# Patient Record
Sex: FEMALE | Race: WHITE | NOT HISPANIC OR LATINO | Employment: OTHER | URBAN - NONMETROPOLITAN AREA
[De-identification: names, ages, dates, MRNs, and addresses within clinical notes are randomized per-mention and may not be internally consistent; named-entity substitution may affect disease eponyms.]

---

## 2017-08-25 ENCOUNTER — OFFICE VISIT (OUTPATIENT)
Dept: PULMONOLOGY | Facility: CLINIC | Age: 57
End: 2017-08-25

## 2017-08-25 VITALS
OXYGEN SATURATION: 96 % | SYSTOLIC BLOOD PRESSURE: 170 MMHG | BODY MASS INDEX: 22.43 KG/M2 | HEIGHT: 68 IN | HEART RATE: 93 BPM | WEIGHT: 148 LBS | TEMPERATURE: 98.6 F | DIASTOLIC BLOOD PRESSURE: 78 MMHG

## 2017-08-25 DIAGNOSIS — R91.1 LUNG NODULE: Primary | ICD-10-CM

## 2017-08-25 DIAGNOSIS — J47.1 BRONCHIECTASIS WITH ACUTE EXACERBATION (HCC): ICD-10-CM

## 2017-08-25 PROCEDURE — 99204 OFFICE O/P NEW MOD 45 MIN: CPT | Performed by: INTERNAL MEDICINE

## 2017-08-25 RX ORDER — LEVOTHYROXINE SODIUM 88 MCG
TABLET ORAL
COMMUNITY
Start: 2017-08-17

## 2017-08-25 RX ORDER — ESCITALOPRAM OXALATE 10 MG/1
TABLET ORAL
COMMUNITY
Start: 2017-07-07

## 2017-08-25 NOTE — PROGRESS NOTES
Rain Pandey is a 57 y.o. female who is being seen for Lung Nodule    History of Present Illness   This 57-year-old female has been referred to us by her primary care provider for evaluation for a lung nodule.  Patient tells me that her primary care provider ordered a screening CT scan of her chest because of her smoking history.  She did not have any symptoms of increased cough chest pain or change in appetite.  She had some weight loss but that was intentional since she was found to have reviewed her blood sugar and she was following her diet.  Patient is a current smoker, smokes roughly 1 pack per day for the past 30-40 years.  One of her sister  of lung cancer.  Past Medical History:   Diagnosis Date   • Thyroid disease      History reviewed. No pertinent surgical history.  Family History   Problem Relation Age of Onset   • Diabetes Mother    • Heart disease Sister    • Cancer Sister       reports that she has been smoking Cigarettes.  She has a 30.00 pack-year smoking history. She has never used smokeless tobacco. She reports that she does not drink alcohol or use illicit drugs.  No Known Allergies        The following portions of the patient's history were reviewed and updated as appropriate: allergies, current medications, past family history, past medical history, past social history, past surgical history and problem list.    Review of Systems   Constitutional: Negative for appetite change, chills, diaphoresis and unexpected weight change.   HENT: Negative for sore throat, trouble swallowing and voice change.    Eyes: Negative for visual disturbance.   Respiratory: Negative for apnea, cough, choking, shortness of breath and wheezing.    Cardiovascular: Negative for chest pain, palpitations and leg swelling.   Gastrointestinal: Negative for abdominal pain, constipation, diarrhea, nausea and vomiting.   Endocrine: Negative for cold intolerance, heat intolerance, polydipsia, polyphagia and  "polyuria.   Genitourinary: Negative for difficulty urinating and dysuria.   Musculoskeletal: Negative for gait problem.   Skin: Negative for rash and wound.   Neurological: Negative for syncope and light-headedness.   Hematological: Negative for adenopathy.   Psychiatric/Behavioral: Negative for agitation, behavioral problems and confusion.   All other systems reviewed and are negative.      Objective   /78 (BP Location: Left arm, Patient Position: Sitting, Cuff Size: Adult)  Pulse 93  Temp 98.6 °F (37 °C) (Oral)   Ht 68\" (172.7 cm)  Wt 148 lb (67.1 kg)  SpO2 96%  BMI 22.5 kg/m2  Physical Exam   Constitutional: She is oriented to person, place, and time.   HENT:   Head: Normocephalic and atraumatic.   Nose: Mucosal edema present.   Eyes: EOM are normal. Pupils are equal, round, and reactive to light.   Neck: Neck supple.   Cardiovascular: Normal rate, regular rhythm and normal heart sounds.    Pulmonary/Chest: She has rhonchi.   Vesicular breath sound bilaterally with prolonged expiratory phase   Abdominal: Soft. Bowel sounds are normal.   Musculoskeletal: Normal range of motion. She exhibits no deformity.   Neurological: She is alert and oriented to person, place, and time.   Skin: Skin is warm and dry.   Psychiatric: She has a normal mood and affect. Her behavior is normal.   Nursing note and vitals reviewed.        Radiology:  Low-dose CT chest done in [facility] on August 1, 2017    Lab Results:  No results found for any previous visit.    Assessment      ICD-10-CM ICD-9-CM   1. Lung nodule R91.1 793.11   2. Bronchiectasis with acute exacerbation J47.1 494.1                DISCUSSION:  This is a pretty asymptomatic patient with an incidental finding of lung nodule noted during a screening chest CT.  The nodule size is approximately 1.4 cm.  She also had a family history of lung cancer.  Neoplastic process definitely remains a worrisome differential diagnosis.  I had a long discussion with the " patient.  General recommendation is to wait 3 months and repeat her chest CT.  Patient asked me questions that if this is cancer then ordered happen if she waits for 3 months?  Obviously the onset is that we don't want to wait for 3 months if indeed this is lung cancer.  Gave her alternatives of follow-up chest CT in 3 months or proceeding with a bronchoscopy for endobronchial evaluation and trying to have tissue diagnosis followed by a PET scan if necessary.  She understood that and took the alar option of going for bronchoscopy.  I told her that this would be done by our partner Dr. Dupont in [facility] and I would see her again once the cytology/pathology report is available.  She requested me to go ahead and schedule the test but she would call us if she changes her mind.    Plan    Bronchoscopy scheduled for August 31, 2017 per Dr. Dupont  No orders of the defined types were placed in this encounter.    No orders of the defined types were placed in this encounter.                 Ben Rizvi MD, FCCP, FAASM  Pulmonary, Critical Care, and Sleep Medicine

## 2017-08-31 ENCOUNTER — ANESTHESIA (OUTPATIENT)
Dept: PERIOP | Facility: HOSPITAL | Age: 57
End: 2017-08-31

## 2017-08-31 ENCOUNTER — HOSPITAL ENCOUNTER (OUTPATIENT)
Facility: HOSPITAL | Age: 57
Setting detail: HOSPITAL OUTPATIENT SURGERY
Discharge: HOME | End: 2017-08-31
Attending: INTERNAL MEDICINE | Admitting: INTERNAL MEDICINE

## 2017-08-31 ENCOUNTER — ANESTHESIA EVENT (OUTPATIENT)
Dept: PERIOP | Facility: HOSPITAL | Age: 57
End: 2017-08-31

## 2017-08-31 VITALS
WEIGHT: 148 LBS | HEART RATE: 85 BPM | TEMPERATURE: 97.9 F | HEIGHT: 68 IN | SYSTOLIC BLOOD PRESSURE: 153 MMHG | BODY MASS INDEX: 22.43 KG/M2 | RESPIRATION RATE: 20 BRPM | DIASTOLIC BLOOD PRESSURE: 80 MMHG | OXYGEN SATURATION: 97 %

## 2017-08-31 DIAGNOSIS — R91.1 LUNG NODULE: ICD-10-CM

## 2017-08-31 DIAGNOSIS — J47.1 BRONCHIECTASIS WITH ACUTE EXACERBATION (HCC): ICD-10-CM

## 2017-08-31 LAB
BASOPHILS # BLD AUTO: 0.05 10*3/MM3 (ref 0–0.3)
BASOPHILS NFR BLD AUTO: 1 % (ref 0–2)
DEPRECATED RDW RBC AUTO: 45.9 FL (ref 37–54)
EOSINOPHIL # BLD AUTO: 0.14 10*3/MM3 (ref 0–0.7)
EOSINOPHIL NFR BLD AUTO: 2.9 % (ref 0–5)
ERYTHROCYTE [DISTWIDTH] IN BLOOD BY AUTOMATED COUNT: 13.4 % (ref 11.5–14.5)
HCT VFR BLD AUTO: 45.8 % (ref 37–47)
HGB BLD-MCNC: 14.9 G/DL (ref 12–16)
IMM GRANULOCYTES # BLD: 0 10*3/MM3 (ref 0–0.03)
IMM GRANULOCYTES NFR BLD: 0 % (ref 0–0.5)
INR PPP: 0.95 (ref 0.9–1.1)
LYMPHOCYTES # BLD AUTO: 1.76 10*3/MM3 (ref 1–3)
LYMPHOCYTES NFR BLD AUTO: 36.1 % (ref 21–51)
MCH RBC QN AUTO: 31.3 PG (ref 27–33)
MCHC RBC AUTO-ENTMCNC: 32.5 G/DL (ref 33–37)
MCV RBC AUTO: 96.2 FL (ref 80–94)
MONOCYTES # BLD AUTO: 0.46 10*3/MM3 (ref 0.1–0.9)
MONOCYTES NFR BLD AUTO: 9.4 % (ref 0–10)
NEUTROPHILS # BLD AUTO: 2.47 10*3/MM3 (ref 1.4–6.5)
NEUTROPHILS NFR BLD AUTO: 50.6 % (ref 30–70)
PLATELET # BLD AUTO: 181 10*3/MM3 (ref 130–400)
PMV BLD AUTO: 10.5 FL (ref 6–10)
PROTHROMBIN TIME: 12.8 SECONDS (ref 11–15.4)
RBC # BLD AUTO: 4.76 10*6/MM3 (ref 4.2–5.4)
WBC NRBC COR # BLD: 4.88 10*3/MM3 (ref 4.5–12.5)

## 2017-08-31 PROCEDURE — 87205 SMEAR GRAM STAIN: CPT | Performed by: INTERNAL MEDICINE

## 2017-08-31 PROCEDURE — 25010000002 FENTANYL CITRATE (PF) 100 MCG/2ML SOLUTION: Performed by: NURSE ANESTHETIST, CERTIFIED REGISTERED

## 2017-08-31 PROCEDURE — 85610 PROTHROMBIN TIME: CPT | Performed by: INTERNAL MEDICINE

## 2017-08-31 PROCEDURE — 87116 MYCOBACTERIA CULTURE: CPT | Performed by: INTERNAL MEDICINE

## 2017-08-31 PROCEDURE — 87102 FUNGUS ISOLATION CULTURE: CPT | Performed by: INTERNAL MEDICINE

## 2017-08-31 PROCEDURE — 25010000002 DEXAMETHASONE PER 1 MG: Performed by: NURSE ANESTHETIST, CERTIFIED REGISTERED

## 2017-08-31 PROCEDURE — 25010000002 PROPOFOL 10 MG/ML EMULSION: Performed by: NURSE ANESTHETIST, CERTIFIED REGISTERED

## 2017-08-31 PROCEDURE — S0260 H&P FOR SURGERY: HCPCS | Performed by: INTERNAL MEDICINE

## 2017-08-31 PROCEDURE — 85025 COMPLETE CBC W/AUTO DIFF WBC: CPT | Performed by: INTERNAL MEDICINE

## 2017-08-31 PROCEDURE — 87070 CULTURE OTHR SPECIMN AEROBIC: CPT | Performed by: INTERNAL MEDICINE

## 2017-08-31 PROCEDURE — 31624 DX BRONCHOSCOPE/LAVAGE: CPT | Performed by: INTERNAL MEDICINE

## 2017-08-31 PROCEDURE — 87206 SMEAR FLUORESCENT/ACID STAI: CPT | Performed by: INTERNAL MEDICINE

## 2017-08-31 PROCEDURE — 25010000002 ONDANSETRON PER 1 MG: Performed by: NURSE ANESTHETIST, CERTIFIED REGISTERED

## 2017-08-31 PROCEDURE — 25010000002 MIDAZOLAM PER 1 MG: Performed by: NURSE ANESTHETIST, CERTIFIED REGISTERED

## 2017-08-31 PROCEDURE — 31652 BRONCH EBUS SAMPLNG 1/2 NODE: CPT | Performed by: INTERNAL MEDICINE

## 2017-08-31 RX ORDER — FENTANYL CITRATE 50 UG/ML
INJECTION, SOLUTION INTRAMUSCULAR; INTRAVENOUS AS NEEDED
Status: DISCONTINUED | OUTPATIENT
Start: 2017-08-31 | End: 2017-08-31 | Stop reason: SURG

## 2017-08-31 RX ORDER — LIDOCAINE HYDROCHLORIDE 20 MG/ML
INJECTION, SOLUTION INFILTRATION; PERINEURAL AS NEEDED
Status: DISCONTINUED | OUTPATIENT
Start: 2017-08-31 | End: 2017-08-31 | Stop reason: SURG

## 2017-08-31 RX ORDER — PROPOFOL 10 MG/ML
VIAL (ML) INTRAVENOUS AS NEEDED
Status: DISCONTINUED | OUTPATIENT
Start: 2017-08-31 | End: 2017-08-31 | Stop reason: SURG

## 2017-08-31 RX ORDER — SODIUM CHLORIDE, SODIUM LACTATE, POTASSIUM CHLORIDE, CALCIUM CHLORIDE 600; 310; 30; 20 MG/100ML; MG/100ML; MG/100ML; MG/100ML
125 INJECTION, SOLUTION INTRAVENOUS CONTINUOUS
Status: DISCONTINUED | OUTPATIENT
Start: 2017-08-31 | End: 2017-08-31 | Stop reason: HOSPADM

## 2017-08-31 RX ORDER — ONDANSETRON 2 MG/ML
INJECTION INTRAMUSCULAR; INTRAVENOUS AS NEEDED
Status: DISCONTINUED | OUTPATIENT
Start: 2017-08-31 | End: 2017-08-31 | Stop reason: SURG

## 2017-08-31 RX ORDER — MIDAZOLAM HYDROCHLORIDE 1 MG/ML
INJECTION INTRAMUSCULAR; INTRAVENOUS AS NEEDED
Status: DISCONTINUED | OUTPATIENT
Start: 2017-08-31 | End: 2017-08-31 | Stop reason: SURG

## 2017-08-31 RX ORDER — ONDANSETRON 2 MG/ML
4 INJECTION INTRAMUSCULAR; INTRAVENOUS ONCE AS NEEDED
Status: DISCONTINUED | OUTPATIENT
Start: 2017-08-31 | End: 2017-08-31 | Stop reason: HOSPADM

## 2017-08-31 RX ORDER — IPRATROPIUM BROMIDE AND ALBUTEROL SULFATE 2.5; .5 MG/3ML; MG/3ML
3 SOLUTION RESPIRATORY (INHALATION) ONCE AS NEEDED
Status: DISCONTINUED | OUTPATIENT
Start: 2017-08-31 | End: 2017-08-31 | Stop reason: HOSPADM

## 2017-08-31 RX ORDER — FAMOTIDINE 10 MG/ML
INJECTION, SOLUTION INTRAVENOUS AS NEEDED
Status: DISCONTINUED | OUTPATIENT
Start: 2017-08-31 | End: 2017-08-31 | Stop reason: SURG

## 2017-08-31 RX ORDER — DEXAMETHASONE SODIUM PHOSPHATE 4 MG/ML
INJECTION, SOLUTION INTRA-ARTICULAR; INTRALESIONAL; INTRAMUSCULAR; INTRAVENOUS; SOFT TISSUE AS NEEDED
Status: DISCONTINUED | OUTPATIENT
Start: 2017-08-31 | End: 2017-08-31 | Stop reason: SURG

## 2017-08-31 RX ORDER — FENTANYL CITRATE 50 UG/ML
50 INJECTION, SOLUTION INTRAMUSCULAR; INTRAVENOUS
Status: DISCONTINUED | OUTPATIENT
Start: 2017-08-31 | End: 2017-08-31 | Stop reason: HOSPADM

## 2017-08-31 RX ORDER — SODIUM CHLORIDE 0.9 % (FLUSH) 0.9 %
1-10 SYRINGE (ML) INJECTION AS NEEDED
Status: DISCONTINUED | OUTPATIENT
Start: 2017-08-31 | End: 2017-08-31 | Stop reason: HOSPADM

## 2017-08-31 RX ADMIN — PROPOFOL 150 MG: 10 INJECTION, EMULSION INTRAVENOUS at 09:51

## 2017-08-31 RX ADMIN — FAMOTIDINE 20 MG: 10 INJECTION, SOLUTION INTRAVENOUS at 09:56

## 2017-08-31 RX ADMIN — ONDANSETRON 4 MG: 2 INJECTION, SOLUTION INTRAMUSCULAR; INTRAVENOUS at 09:56

## 2017-08-31 RX ADMIN — LIDOCAINE HYDROCHLORIDE 60 MG: 20 INJECTION, SOLUTION INFILTRATION; PERINEURAL at 09:51

## 2017-08-31 RX ADMIN — EPHEDRINE SULFATE 10 MG: 50 INJECTION INTRAMUSCULAR; INTRAVENOUS; SUBCUTANEOUS at 10:00

## 2017-08-31 RX ADMIN — MIDAZOLAM HYDROCHLORIDE 2 MG: 1 INJECTION, SOLUTION INTRAMUSCULAR; INTRAVENOUS at 09:44

## 2017-08-31 RX ADMIN — FENTANYL CITRATE 50 MCG: 50 INJECTION INTRAMUSCULAR; INTRAVENOUS at 10:02

## 2017-08-31 RX ADMIN — FENTANYL CITRATE 50 MCG: 50 INJECTION INTRAMUSCULAR; INTRAVENOUS at 09:51

## 2017-08-31 RX ADMIN — EPHEDRINE SULFATE 5 MG: 50 INJECTION INTRAMUSCULAR; INTRAVENOUS; SUBCUTANEOUS at 10:10

## 2017-08-31 RX ADMIN — SODIUM CHLORIDE, POTASSIUM CHLORIDE, SODIUM LACTATE AND CALCIUM CHLORIDE: 600; 310; 30; 20 INJECTION, SOLUTION INTRAVENOUS at 09:47

## 2017-08-31 RX ADMIN — DEXAMETHASONE SODIUM PHOSPHATE 4 MG: 4 INJECTION, SOLUTION INTRAMUSCULAR; INTRAVENOUS at 09:56

## 2017-09-02 LAB
BACTERIA SPEC RESP CULT: NORMAL
GRAM STN SPEC: NORMAL

## 2017-09-05 LAB
LAB AP CASE REPORT: NORMAL
LAB AP CASE REPORT: NORMAL
Lab: NORMAL
Lab: NORMAL

## 2017-09-14 ENCOUNTER — OFFICE VISIT (OUTPATIENT)
Dept: PULMONOLOGY | Facility: CLINIC | Age: 57
End: 2017-09-14

## 2017-09-14 VITALS
WEIGHT: 148 LBS | SYSTOLIC BLOOD PRESSURE: 160 MMHG | BODY MASS INDEX: 22.43 KG/M2 | DIASTOLIC BLOOD PRESSURE: 66 MMHG | HEIGHT: 68 IN | HEART RATE: 93 BPM | TEMPERATURE: 98.3 F | OXYGEN SATURATION: 97 %

## 2017-09-14 DIAGNOSIS — R91.1 LUNG NODULE: Primary | ICD-10-CM

## 2017-09-14 DIAGNOSIS — J47.9 BRONCHIECTASIS WITHOUT COMPLICATION (HCC): ICD-10-CM

## 2017-09-14 PROCEDURE — 99214 OFFICE O/P EST MOD 30 MIN: CPT | Performed by: INTERNAL MEDICINE

## 2017-09-14 NOTE — PROGRESS NOTES
Rain Pandey is a 57 y.o. female who is being seen for Lung Nodule    History of Present Illness   Patient returns after bronchoscopy.  This patient was referred to us with an abnormal CT scan of the chest which was done as a screening CT.  The scan showed presence of bronchiectasis in the right midlung zone and distal to a bronchiectatic area there was an approximately 1.4 cm size nodule or density in the right middle lobe.  We recommended bronchoscopy for further evaluation, that was done by our partner Dr. Dupont.  Patient is here to discuss about the result.  Symptomatically she has usual exertional dyspnea and slight cough, no change in her overall status.  Past Medical History:   Diagnosis Date   • Thyroid disease      Past Surgical History:   Procedure Laterality Date   • BRONCHOSCOPY N/A 8/31/2017    Procedure: BRONCHOSCOPY WITH ENDOBRONCHIAL ULTRASOUND;  Surgeon: Marcello Dupont MD;  Location: [facility];  Service:    • COLONOSCOPY  2011     Family History   Problem Relation Age of Onset   • Diabetes Mother    • Heart disease Sister    • Cancer Sister       reports that she has been smoking Cigarettes.  She has a 30.00 pack-year smoking history. She has never used smokeless tobacco. She reports that she does not drink alcohol or use illicit drugs.  No Known Allergies        The following portions of the patient's history were reviewed and updated as appropriate: allergies, current medications, past family history, past medical history, past social history, past surgical history and problem list.    Review of Systems   Constitutional: Negative for appetite change, chills, diaphoresis and unexpected weight change.   HENT: Negative for sore throat, trouble swallowing and voice change.    Eyes: Negative for visual disturbance.   Respiratory: Negative for apnea, cough, choking, shortness of breath and wheezing.    Cardiovascular: Negative for chest pain, palpitations and leg swelling.  "  Gastrointestinal: Negative for abdominal pain, constipation, diarrhea, nausea and vomiting.   Endocrine: Negative for cold intolerance, heat intolerance, polydipsia, polyphagia and polyuria.   Genitourinary: Negative for difficulty urinating and dysuria.   Musculoskeletal: Negative for gait problem.   Skin: Negative for rash and wound.   Neurological: Negative for syncope and light-headedness.   Hematological: Negative for adenopathy.   Psychiatric/Behavioral: Negative for agitation, behavioral problems and confusion.   All other systems reviewed and are negative.      Objective   /66  Pulse 93  Temp 98.3 °F (36.8 °C) (Oral)   Ht 68\" (172.7 cm)  Wt 148 lb (67.1 kg)  SpO2 97%  BMI 22.5 kg/m2  Physical Exam   Constitutional: She is oriented to person, place, and time.   HENT:   Head: Normocephalic and atraumatic.   Nose: Mucosal edema present.   Eyes: EOM are normal. Pupils are equal, round, and reactive to light.   Neck: Neck supple.   Cardiovascular: Normal rate, regular rhythm and normal heart sounds.    Pulmonary/Chest: She has rhonchi.   Vesicular breath sound bilaterally with prolonged expiratory phase   Abdominal: Soft. Bowel sounds are normal.   Musculoskeletal: Normal range of motion. She exhibits no deformity.   Neurological: She is alert and oriented to person, place, and time.   Skin: Skin is warm and dry.   Psychiatric: She has a normal mood and affect. Her behavior is normal.   Nursing note and vitals reviewed.        Radiology:  No Images in the past 120 days found..    Lab Results:  Admission on 08/31/2017, Discharged on 08/31/2017   Component Date Value Ref Range Status   • Protime 08/31/2017 12.8  11.0 - 15.4 Seconds Final   • INR 08/31/2017 0.95  0.90 - 1.10 Final   • WBC 08/31/2017 4.88  4.50 - 12.50 10*3/mm3 Final   • RBC 08/31/2017 4.76  4.20 - 5.40 10*6/mm3 Final   • Hemoglobin 08/31/2017 14.9  12.0 - 16.0 g/dL Final   • Hematocrit 08/31/2017 45.8  37.0 - 47.0 % Final   • MCV " 08/31/2017 96.2* 80.0 - 94.0 fL Final   • MCH 08/31/2017 31.3  27.0 - 33.0 pg Final   • MCHC 08/31/2017 32.5* 33.0 - 37.0 g/dL Final   • RDW 08/31/2017 13.4  11.5 - 14.5 % Final   • RDW-SD 08/31/2017 45.9  37.0 - 54.0 fl Final   • MPV 08/31/2017 10.5* 6.0 - 10.0 fL Final   • Platelets 08/31/2017 181  130 - 400 10*3/mm3 Final   • Neutrophil % 08/31/2017 50.6  30.0 - 70.0 % Final   • Lymphocyte % 08/31/2017 36.1  21.0 - 51.0 % Final   • Monocyte % 08/31/2017 9.4  0.0 - 10.0 % Final   • Eosinophil % 08/31/2017 2.9  0.0 - 5.0 % Final   • Basophil % 08/31/2017 1.0  0.0 - 2.0 % Final   • Immature Grans % 08/31/2017 0.0  0.0 - 0.5 % Final   • Neutrophils, Absolute 08/31/2017 2.47  1.40 - 6.50 10*3/mm3 Final   • Lymphocytes, Absolute 08/31/2017 1.76  1.00 - 3.00 10*3/mm3 Final   • Monocytes, Absolute 08/31/2017 0.46  0.10 - 0.90 10*3/mm3 Final   • Eosinophils, Absolute 08/31/2017 0.14  0.00 - 0.70 10*3/mm3 Final   • Basophils, Absolute 08/31/2017 0.05  0.00 - 0.30 10*3/mm3 Final   • Immature Grans, Absolute 08/31/2017 0.00  0.00 - 0.03 10*3/mm3 Final   • Case Report 08/31/2017    Final                    Value: COR Non-Gyn Cytology                           Case: [id number]                                  Authorizing Provider:  Marcello Dupont MD         Collected:           08/31/2017 09:51 AM          Ordering Location:     [facility]      Received:            08/31/2017 01:36 PM                                 OPERATING ROOM DEPARTMENT                                                    Pathologist:           Russell Arevalo MD                                                      Specimens:   1) - Lung, Right Middle Lobe, RML Brushing                                                          2) - Bronchus, Bronch Wash                                                                          3) - Lung, Right Upper Lobe, RUL BAL                                                                4) - Lung, Right  Lower Lobe, RLL BAL                                                                5) - Lung, Right Middle Lobe, RML BAL                                                     • Fungus Stain 08/31/2017 Final report   Preliminary   • KOH/Calcofluor preparation 08/31/2017 Comment   Preliminary   • AFB Specimen Processing 08/31/2017 Concentration   Preliminary   • Acid Fast Smear 08/31/2017 Negative   Preliminary   • Respiratory Culture 08/31/2017 Light growth (2+) Normal Respiratory Antonella   Final   • Gram Stain Result 08/31/2017 Rare (1+) WBCs seen   Final   • Gram Stain Result 08/31/2017 No organisms seen   Final   • Fungus Stain 08/31/2017 Final report   Preliminary   • KOH/Calcofluor preparation 08/31/2017 Comment   Preliminary   • AFB Specimen Processing 08/31/2017 Concentration   Preliminary   • Acid Fast Smear 08/31/2017 Negative   Preliminary   • Respiratory Culture 08/31/2017 Scant growth (1+) Normal Respiratory Antonella   Final   • Gram Stain Result 08/31/2017 Rare (1+) WBCs seen   Final   • Gram Stain Result 08/31/2017 No organisms seen   Final   • Case Report 08/31/2017    Final                    Value:BH COR Fine Needle Aspirate                       Case: [id number]                                 Authorizing Provider:  Marcello Dupont MD         Collected:           08/31/2017 10:09 AM          Ordering Location:     [facility]      Received:            08/31/2017 01:37 PM                                 OPERATING ROOM DEPARTMENT                                                    Pathologist:           Russell Arevalo MD                                                      Specimen:    Bronchus, Subcarinal Fine Needle BX                                                       • Fungus Stain 08/31/2017 Final report   Preliminary   • KOH/Calcofluor preparation 08/31/2017 Comment   Preliminary   • AFB Specimen Processing 08/31/2017 Concentration   Preliminary   • Acid Fast Smear 08/31/2017  Negative   Preliminary   • Respiratory Culture 08/31/2017 Light growth (2+) Normal Respiratory Antonella   Final   • Gram Stain Result 08/31/2017 Moderate (3+) WBCs seen   Final   • Gram Stain Result 08/31/2017 Rare (1+) Epithelial cells seen   Final   • Gram Stain Result 08/31/2017 Few (2+) Gram positive cocci in pairs   Final       Assessment      ICD-10-CM ICD-9-CM   1. Lung nodule R91.1 793.11   2. Bronchiectasis without complication J47.9 494.0                DISCUSSION:  I have reviewed the bronchoscopy report, no endobronchial lesion was noted.  Bronchial washing, brushing and bronchoalveolar lavage from right middle lobe as well as right lower lobe did not show any malignant cells.  A subcarinal lymph node was sampled using endobronchial ultrasonogram, this was also negative for malignant cells.    I have discussed the result with the patient.  Told her that this is a great news but patient still remains very much worried.  She had strong family history of lung cancer and is very nervous about that.  I would send her for a PET scan to see if the nodule is hypermetabolic on not.  I like to reevaluate her again after that and further recommendation will depend on the findings.    Plan    Orders Placed This Encounter   Procedures   • NM Pet Skull Base To Mid Thigh     No orders of the defined types were placed in this encounter.                 Ben Rizvi MD, FCCP, FAASM  Pulmonary, Critical Care, and Sleep Medicine

## 2017-09-22 ENCOUNTER — HOSPITAL ENCOUNTER (OUTPATIENT)
Dept: PET IMAGING | Facility: HOSPITAL | Age: 57
Discharge: HOME | End: 2017-09-22
Attending: INTERNAL MEDICINE | Admitting: INTERNAL MEDICINE

## 2017-09-22 DIAGNOSIS — R91.1 LUNG NODULE: ICD-10-CM

## 2017-09-22 DIAGNOSIS — J47.9 BRONCHIECTASIS WITHOUT COMPLICATION (HCC): ICD-10-CM

## 2017-09-22 PROCEDURE — 78815 PET IMAGE W/CT SKULL-THIGH: CPT | Performed by: RADIOLOGY

## 2017-09-22 PROCEDURE — A9552 F18 FDG: HCPCS | Performed by: INTERNAL MEDICINE

## 2017-09-22 PROCEDURE — 0 FLUDEOXYGLUCOSE F18 SOLUTION: Performed by: INTERNAL MEDICINE

## 2017-09-22 PROCEDURE — 78815 PET IMAGE W/CT SKULL-THIGH: CPT

## 2017-09-22 RX ADMIN — FLUDEOXYGLUCOSE F18 1 DOSE: 300 INJECTION INTRAVENOUS at 14:09

## 2017-09-29 LAB
BACTERIA SPEC AEROBE CULT: NORMAL
FUNGUS SPEC CULT: NORMAL
FUNGUS SPEC FUNGUS STN: NORMAL

## 2017-10-03 ENCOUNTER — OFFICE VISIT (OUTPATIENT)
Dept: PULMONOLOGY | Facility: CLINIC | Age: 57
End: 2017-10-03

## 2017-10-03 VITALS
OXYGEN SATURATION: 98 % | HEIGHT: 68 IN | DIASTOLIC BLOOD PRESSURE: 80 MMHG | BODY MASS INDEX: 22.58 KG/M2 | HEART RATE: 95 BPM | TEMPERATURE: 98.2 F | SYSTOLIC BLOOD PRESSURE: 150 MMHG | WEIGHT: 149 LBS

## 2017-10-03 DIAGNOSIS — J47.9 BRONCHIECTASIS WITHOUT COMPLICATION (HCC): ICD-10-CM

## 2017-10-03 DIAGNOSIS — R91.1 LUNG NODULE: Primary | ICD-10-CM

## 2017-10-03 PROCEDURE — 99214 OFFICE O/P EST MOD 30 MIN: CPT | Performed by: INTERNAL MEDICINE

## 2017-10-03 NOTE — PROGRESS NOTES
Rain Pandey is a 57 y.o. female who is being seen for Lung Nodule    History of Present Illness   Patient returns after PET scan.  This patient presented to us with a lung nodule, a bronchoscopy was done no endobronchial lesion was noted and bronchial washing status BAL as well as fine-needle aspiration was negative for malignant cells.  We then decided to send her for a PET scan, she had it done in today is here to discuss about the result.    Symptom wise she still has the same symptoms of exertional dyspnea but did not notice any change.  Past Medical History:   Diagnosis Date   • Thyroid disease      Past Surgical History:   Procedure Laterality Date   • BRONCHOSCOPY N/A 8/31/2017    Procedure: BRONCHOSCOPY WITH ENDOBRONCHIAL ULTRASOUND;  Surgeon: Marcello Dupont MD;  Location: [facility];  Service:    • COLONOSCOPY  2011     Family History   Problem Relation Age of Onset   • Diabetes Mother    • Heart disease Sister    • Cancer Sister       reports that she has been smoking Cigarettes.  She has a 30.00 pack-year smoking history. She has never used smokeless tobacco. She reports that she does not drink alcohol or use illicit drugs.  No Known Allergies        The following portions of the patient's history were reviewed and updated as appropriate: allergies, current medications, past family history, past medical history, past social history, past surgical history and problem list.    Review of Systems   Constitutional: Negative for appetite change, chills, diaphoresis and unexpected weight change.   HENT: Negative for sore throat, trouble swallowing and voice change.    Eyes: Negative for visual disturbance.   Respiratory: Negative for apnea, cough, choking, shortness of breath and wheezing.    Cardiovascular: Negative for chest pain, palpitations and leg swelling.   Gastrointestinal: Negative for abdominal pain, constipation, diarrhea, nausea and vomiting.   Endocrine: Negative for cold intolerance,  "heat intolerance, polydipsia, polyphagia and polyuria.   Genitourinary: Negative for difficulty urinating and dysuria.   Musculoskeletal: Negative for gait problem.   Skin: Negative for rash and wound.   Neurological: Negative for syncope and light-headedness.   Hematological: Negative for adenopathy.   Psychiatric/Behavioral: Negative for agitation, behavioral problems and confusion.   All other systems reviewed and are negative.      Objective   /80  Pulse 95  Temp 98.2 °F (36.8 °C) (Oral)   Ht 68\" (172.7 cm)  Wt 149 lb (67.6 kg)  SpO2 98%  BMI 22.66 kg/m2  Physical Exam   Constitutional: She is oriented to person, place, and time.   HENT:   Head: Normocephalic and atraumatic.   Nose: Mucosal edema present.   Eyes: EOM are normal. Pupils are equal, round, and reactive to light.   Neck: Neck supple.   Cardiovascular: Normal rate, regular rhythm and normal heart sounds.    Pulmonary/Chest: She has rhonchi.   Vesicular breath sound bilaterally with prolonged expiratory phase   Abdominal: Soft. Bowel sounds are normal.   Musculoskeletal: Normal range of motion. She exhibits no deformity.   Neurological: She is alert and oriented to person, place, and time.   Skin: Skin is warm and dry.   Psychiatric: She has a normal mood and affect. Her behavior is normal.   Nursing note and vitals reviewed.        Radiology:  Nm Pet Skull Base To Mid Thigh    Result Date: 9/25/2017  NM PET SKULL BASE TO MID THIGH-      REASON FOR EXAM:  R91.1-Solitary pulmonary nodule; J47.9-Bronchiectasis,  uncomplicated.      COMPARISON:  There are no prior studies for comparison.      TECHNIQUE: The patient's glucose level was 72. 15.6 mCi of  fluorodeoxyglucose was injected. After a 1-hour incubation period PET  fusion CT images were obtained from the skull base to the mid thighs.      PET/CT FINDINGS: The PET and CT images from the base of the skull to the  thoracic inlet were unremarkable. There were no enlarged or  hypermetabolic " lymph nodes in the mediastinum or hilar areas. In the  right lung base anteriorly in the costophrenic angle region there is a  soft tissue density that measures 13 mm in diameter. There is some vague  alveolar infiltrate around this area. The peak SUV activity in this area  was 2.4 which is barely above background. This would be most consistent  with possibly a minimal inflammatory process. No other pulmonary  parenchymal abnormalities were demonstrated. There were no pleural  effusions. Below the diaphragm the distribution of the  fluorodeoxyglucose was physiologic. There were no enlarged lymph nodes  or abdominal masses.      IMPRESSION:  Small 13 mm opacity in the right lung anteriorly at the  cardiophrenic angle shows only minimal glucose uptake with an SUV value  reading of 2.4. This is felt to most likely represent a benign process.  Recommend correlation with biopsy and follow-up scans as clinically  indicated to further evaluate this area.      This report was finalized on 9/25/2017 10:16 AM by Dr. Hua Carvajal II, MD.      Lab Results:  Admission on 08/31/2017, Discharged on 08/31/2017   Component Date Value Ref Range Status   • Protime 08/31/2017 12.8  11.0 - 15.4 Seconds Final   • INR 08/31/2017 0.95  0.90 - 1.10 Final   • WBC 08/31/2017 4.88  4.50 - 12.50 10*3/mm3 Final   • RBC 08/31/2017 4.76  4.20 - 5.40 10*6/mm3 Final   • Hemoglobin 08/31/2017 14.9  12.0 - 16.0 g/dL Final   • Hematocrit 08/31/2017 45.8  37.0 - 47.0 % Final   • MCV 08/31/2017 96.2* 80.0 - 94.0 fL Final   • MCH 08/31/2017 31.3  27.0 - 33.0 pg Final   • MCHC 08/31/2017 32.5* 33.0 - 37.0 g/dL Final   • RDW 08/31/2017 13.4  11.5 - 14.5 % Final   • RDW-SD 08/31/2017 45.9  37.0 - 54.0 fl Final   • MPV 08/31/2017 10.5* 6.0 - 10.0 fL Final   • Platelets 08/31/2017 181  130 - 400 10*3/mm3 Final   • Neutrophil % 08/31/2017 50.6  30.0 - 70.0 % Final   • Lymphocyte % 08/31/2017 36.1  21.0 - 51.0 % Final   • Monocyte % 08/31/2017 9.4  0.0 - 10.0  % Final   • Eosinophil % 08/31/2017 2.9  0.0 - 5.0 % Final   • Basophil % 08/31/2017 1.0  0.0 - 2.0 % Final   • Immature Grans % 08/31/2017 0.0  0.0 - 0.5 % Final   • Neutrophils, Absolute 08/31/2017 2.47  1.40 - 6.50 10*3/mm3 Final   • Lymphocytes, Absolute 08/31/2017 1.76  1.00 - 3.00 10*3/mm3 Final   • Monocytes, Absolute 08/31/2017 0.46  0.10 - 0.90 10*3/mm3 Final   • Eosinophils, Absolute 08/31/2017 0.14  0.00 - 0.70 10*3/mm3 Final   • Basophils, Absolute 08/31/2017 0.05  0.00 - 0.30 10*3/mm3 Final   • Immature Grans, Absolute 08/31/2017 0.00  0.00 - 0.03 10*3/mm3 Final   • Case Report 08/31/2017    Final                    Value:[facility] COR Non-Gyn Cytology                           Case: [id number]                                  Authorizing Provider:  Marcello Dupont MD         Collected:           08/31/2017 09:51 AM          Ordering Location:     [facility]      Received:            08/31/2017 01:36 PM                                 OPERATING ROOM DEPARTMENT                                                    Pathologist:           Russell Arevalo MD                                                      Specimens:   1) - Lung, Right Middle Lobe, RML Brushing                                                          2) - Bronchus, Bronch Wash                                                                          3) - Lung, Right Upper Lobe, RUL BAL                                                                4) - Lung, Right Lower Lobe, RLL BAL                                                                5) - Lung, Right Middle Lobe, RML BAL                                                     • Fungus Stain 08/31/2017 Final report   Final   • KOH/Calcofluor preparation 08/31/2017 Comment   Final   • Culture 08/31/2017 Final report   Final   • Fungus (Mycology) Result 1 08/31/2017 Comment   Final   • AFB Specimen Processing 08/31/2017 Concentration   Preliminary   • Acid Fast Smear  08/31/2017 Negative   Preliminary   • Respiratory Culture 08/31/2017 Light growth (2+) Normal Respiratory Antonella   Final   • Gram Stain Result 08/31/2017 Rare (1+) WBCs seen   Final   • Gram Stain Result 08/31/2017 No organisms seen   Final   • Fungus Stain 08/31/2017 Final report   Final   • KOH/Calcofluor preparation 08/31/2017 Comment   Final   • Culture 08/31/2017 Final report   Final   • Fungus (Mycology) Result 1 08/31/2017 Comment   Final   • AFB Specimen Processing 08/31/2017 Concentration   Preliminary   • Acid Fast Smear 08/31/2017 Negative   Preliminary   • Respiratory Culture 08/31/2017 Scant growth (1+) Normal Respiratory Antonella   Final   • Gram Stain Result 08/31/2017 Rare (1+) WBCs seen   Final   • Gram Stain Result 08/31/2017 No organisms seen   Final   • Case Report 08/31/2017    Final                    Value:[facility] COR Fine Needle Aspirate                       Case: [id number]                                 Authorizing Provider:  Marcello Dupont MD         Collected:           08/31/2017 10:09 AM          Ordering Location:     [facility]      Received:            08/31/2017 01:37 PM                                 OPERATING ROOM DEPARTMENT                                                    Pathologist:           Russell Arevalo MD                                                      Specimen:    Bronchus, Subcarinal Fine Needle BX                                                       • Fungus Stain 08/31/2017 Final report   Final   • KOH/Calcofluor preparation 08/31/2017 Comment   Final   • Culture 08/31/2017 Final report   Final   • Fungus (Mycology) Result 1 08/31/2017 Comment   Final   • AFB Specimen Processing 08/31/2017 Concentration   Preliminary   • Acid Fast Smear 08/31/2017 Negative   Preliminary   • Respiratory Culture 08/31/2017 Light growth (2+) Normal Respiratory Antonella   Final   • Gram Stain Result 08/31/2017 Moderate (3+) WBCs seen   Final   • Gram Stain Result  08/31/2017 Rare (1+) Epithelial cells seen   Final   • Gram Stain Result 08/31/2017 Few (2+) Gram positive cocci in pairs   Final       Assessment      ICD-10-CM ICD-9-CM   1. Lung nodule R91.1 793.11   2. Bronchiectasis without complication J47.9 494.0                DISCUSSION:  I have reviewed the PET scan.  1.3 cm size left lower lobe nodule has SUV range of 2.4, which on radiologist is commenting as close to normal.  Patient is pretty much asymptomatic at this point.  Our plan is to repeat her CT scan of the chest in February, which would be 6 months from her prior CT scan.  I have told her to call us if she develops any new symptoms including increased shortness of breath, cough, chest pain, hemoptysis or weight loss.  In that case we would see her earlier and would act accordingly.    Plan    No orders of the defined types were placed in this encounter.    No orders of the defined types were placed in this encounter.                 Ben Rizvi MD, FCCP, FAASM  Pulmonary, Critical Care, and Sleep Medicine

## 2017-10-13 LAB
ACID FAST STN SPEC: NEGATIVE
BACTERIA SPEC AEROBE CULT: NEGATIVE
SPECIMEN PREPARATION: NORMAL

## 2018-03-29 ENCOUNTER — OFFICE VISIT (OUTPATIENT)
Dept: PULMONOLOGY | Facility: CLINIC | Age: 58
End: 2018-03-29

## 2018-03-29 VITALS
TEMPERATURE: 98.1 F | WEIGHT: 164.4 LBS | SYSTOLIC BLOOD PRESSURE: 149 MMHG | HEIGHT: 68 IN | HEART RATE: 91 BPM | BODY MASS INDEX: 24.92 KG/M2 | OXYGEN SATURATION: 98 % | DIASTOLIC BLOOD PRESSURE: 79 MMHG

## 2018-03-29 DIAGNOSIS — R91.1 LUNG NODULE: Primary | ICD-10-CM

## 2018-03-29 DIAGNOSIS — J47.9 BRONCHIECTASIS WITHOUT COMPLICATION (HCC): ICD-10-CM

## 2018-03-29 PROCEDURE — 99214 OFFICE O/P EST MOD 30 MIN: CPT | Performed by: INTERNAL MEDICINE

## 2018-03-29 RX ORDER — LISINOPRIL 5 MG/1
TABLET ORAL
COMMUNITY
Start: 2018-02-13

## 2018-03-29 NOTE — PROGRESS NOTES
Rain Pandey is a 58 y.o. female who is being seen for Lung Nodule    History of Present Illness   Patient returns for follow up for lung nodule.  She initially presented to us in August 2017, had a CT scan done in [facility] showing an 1.4 cm size nodule.  We did a bronchoscopy on her which was negative and later on need a PET scan.  The PET scan showed some hypermetabolic activity in the nodule with SUV range of 2.4.  Because of this we decided to repeat her CT scan in few months, today she is here to discuss about it.  She does not have any new complaints.  Past Medical History:   Diagnosis Date   • Thyroid disease      Past Surgical History:   Procedure Laterality Date   • BRONCHOSCOPY N/A 8/31/2017    Procedure: BRONCHOSCOPY WITH ENDOBRONCHIAL ULTRASOUND;  Surgeon: Marcello Dupont MD;  Location: [facility];  Service:    • COLONOSCOPY  2011     Family History   Problem Relation Age of Onset   • Diabetes Mother    • Heart disease Sister    • Cancer Sister       reports that she has been smoking Cigarettes.  She has a 30.00 pack-year smoking history. She has never used smokeless tobacco. She reports that she does not drink alcohol or use drugs.  No Known Allergies        Patient has received flu shot and pneumonia vaccination.     The following portions of the patient's history were reviewed and updated as appropriate: allergies, current medications, past family history, past medical history, past social history, past surgical history and problem list.    Review of Systems   Constitutional: Negative for appetite change, chills, diaphoresis and unexpected weight change.   HENT: Negative for sore throat, trouble swallowing and voice change.    Eyes: Negative for visual disturbance.   Respiratory: Positive for shortness of breath. Negative for apnea, cough, choking and wheezing.    Cardiovascular: Negative for chest pain, palpitations and leg swelling.   Gastrointestinal: Negative for  "abdominal pain, constipation, diarrhea, nausea and vomiting.   Endocrine: Negative for cold intolerance, heat intolerance, polydipsia, polyphagia and polyuria.   Genitourinary: Negative for difficulty urinating and dysuria.   Musculoskeletal: Negative for gait problem.   Skin: Negative for rash and wound.   Neurological: Negative for syncope and light-headedness.   Hematological: Negative for adenopathy.   Psychiatric/Behavioral: Negative for agitation, behavioral problems and confusion.   All other systems reviewed and are negative.      Objective   /79   Pulse 91   Temp 98.1 °F (36.7 °C)   Ht 172.7 cm (67.99\")   Wt 74.6 kg (164 lb 6.4 oz)   SpO2 98%   BMI 25.00 kg/m²   Physical Exam   Constitutional: She is oriented to person, place, and time. She appears well-developed and well-nourished.   HENT:   Head: Normocephalic and atraumatic.   Nose: Nose normal.   Mouth/Throat: Oropharynx is clear and moist.   Eyes: EOM are normal. Pupils are equal, round, and reactive to light.   Neck: Normal range of motion. Neck supple.   Cardiovascular: Normal rate, regular rhythm and normal heart sounds.    Pulmonary/Chest: Breath sounds normal. She has no wheezes. She has no rales.   Abdominal: Soft. Bowel sounds are normal.   Musculoskeletal: Normal range of motion.   Neurological: She is alert and oriented to person, place, and time.   Skin: Skin is warm and dry.   Psychiatric: She has a normal mood and affect. Her behavior is normal.   Nursing note and vitals reviewed.        Radiology:  PET scan  NM PET SKULL BASE TO MID THIGH-     REASON FOR EXAM:  R91.1-Solitary pulmonary nodule; J47.9-Bronchiectasis,  uncomplicated.     COMPARISON:  There are no prior studies for comparison.     TECHNIQUE: The patient's glucose level was 72. 15.6 mCi of  fluorodeoxyglucose was injected. After a 1-hour incubation period PET  fusion CT images were obtained from the skull base to the mid thighs.     PET/CT FINDINGS: The PET and CT " images from the base of the skull to the  thoracic inlet were unremarkable. There were no enlarged or  hypermetabolic lymph nodes in the mediastinum or hilar areas. In the  right lung base anteriorly in the costophrenic angle region there is a  soft tissue density that measures 13 mm in diameter. There is some vague  alveolar infiltrate around this area. The peak SUV activity in this area  was 2.4 which is barely above background. This would be most consistent  with possibly a minimal inflammatory process. No other pulmonary  parenchymal abnormalities were demonstrated. There were no pleural  effusions. Below the diaphragm the distribution of the  fluorodeoxyglucose was physiologic. There were no enlarged lymph nodes  or abdominal masses.     IMPRESSION:  Small 13 mm opacity in the right lung anteriorly at the  cardiophrenic angle shows only minimal glucose uptake with an SUV value  reading of 2.4. This is felt to most likely represent a benign process.  Recommend correlation with biopsy and follow-up scans as clinically  indicated to further evaluate this area.     This report was finalized on 9/25/2017 10:16 AM by Dr. Hua Carvajal II, MD.  Lab Results:  Admission on 08/31/2017, Discharged on 08/31/2017   Component Date Value Ref Range Status   • Protime 08/31/2017 12.8  11.0 - 15.4 Seconds Final   • INR 08/31/2017 0.95  0.90 - 1.10 Final   • WBC 08/31/2017 4.88  4.50 - 12.50 10*3/mm3 Final   • RBC 08/31/2017 4.76  4.20 - 5.40 10*6/mm3 Final   • Hemoglobin 08/31/2017 14.9  12.0 - 16.0 g/dL Final   • Hematocrit 08/31/2017 45.8  37.0 - 47.0 % Final   • MCV 08/31/2017 96.2* 80.0 - 94.0 fL Final   • MCH 08/31/2017 31.3  27.0 - 33.0 pg Final   • MCHC 08/31/2017 32.5* 33.0 - 37.0 g/dL Final   • RDW 08/31/2017 13.4  11.5 - 14.5 % Final   • RDW-SD 08/31/2017 45.9  37.0 - 54.0 fl Final   • MPV 08/31/2017 10.5* 6.0 - 10.0 fL Final   • Platelets 08/31/2017 181  130 - 400 10*3/mm3 Final   • Neutrophil % 08/31/2017 50.6   30.0 - 70.0 % Final   • Lymphocyte % 08/31/2017 36.1  21.0 - 51.0 % Final   • Monocyte % 08/31/2017 9.4  0.0 - 10.0 % Final   • Eosinophil % 08/31/2017 2.9  0.0 - 5.0 % Final   • Basophil % 08/31/2017 1.0  0.0 - 2.0 % Final   • Immature Grans % 08/31/2017 0.0  0.0 - 0.5 % Final   • Neutrophils, Absolute 08/31/2017 2.47  1.40 - 6.50 10*3/mm3 Final   • Lymphocytes, Absolute 08/31/2017 1.76  1.00 - 3.00 10*3/mm3 Final   • Monocytes, Absolute 08/31/2017 0.46  0.10 - 0.90 10*3/mm3 Final   • Eosinophils, Absolute 08/31/2017 0.14  0.00 - 0.70 10*3/mm3 Final   • Basophils, Absolute 08/31/2017 0.05  0.00 - 0.30 10*3/mm3 Final   • Immature Grans, Absolute 08/31/2017 0.00  0.00 - 0.03 10*3/mm3 Final   • Case Report 09/05/2017    Final                    Value: COR Non-Gyn Cytology                           Case: [id number]                                  Authorizing Provider:  Marcello Dupont MD         Collected:           08/31/2017 09:51 AM          Ordering Location:     [facility]      Received:            08/31/2017 01:36 PM                                 OPERATING ROOM DEPARTMENT                                                    Pathologist:           Russell Arevalo MD                                                      Specimens:   1) - Lung, Right Middle Lobe, RML Brushing                                                          2) - Bronchus, Bronch Wash                                                                          3) - Lung, Right Upper Lobe, RUL BAL                                                                4) - Lung, Right Lower Lobe, RLL BAL                                                                5) - Lung, Right Middle Lobe, RML BAL                                                     • Fungus Stain 09/29/2017 Final report   Final   • KOH/Calcofluor preparation 09/29/2017 Comment   Final   • Culture 09/29/2017 Final report   Final   • Fungus (Mycology) Result 1  09/29/2017 Comment   Final   • AFB Specimen Processing 10/13/2017 Concentration   Final   • Acid Fast Smear 10/13/2017 Negative   Final   • Culture 10/13/2017 Negative   Final   • Respiratory Culture 09/02/2017 Light growth (2+) Normal Respiratory Antonella   Final   • Gram Stain Result 09/02/2017 Rare (1+) WBCs seen   Final   • Gram Stain Result 09/02/2017 No organisms seen   Final   • Fungus Stain 09/29/2017 Final report   Final   • KOH/Calcofluor preparation 09/29/2017 Comment   Final   • Culture 09/29/2017 Final report   Final   • Fungus (Mycology) Result 1 09/29/2017 Comment   Final   • AFB Specimen Processing 10/13/2017 Concentration   Final   • Acid Fast Smear 10/13/2017 Negative   Final   • Culture 10/13/2017 Negative   Final   • Respiratory Culture 09/02/2017 Scant growth (1+) Normal Respiratory Antonella   Final   • Gram Stain Result 09/02/2017 Rare (1+) WBCs seen   Final   • Gram Stain Result 09/02/2017 No organisms seen   Final   • Case Report 09/05/2017    Final                    Value:BH COR Fine Needle Aspirate                       Case: [id number]                                 Authorizing Provider:  Marcello Dupont MD         Collected:           08/31/2017 10:09 AM          Ordering Location:     [facility]      Received:            08/31/2017 01:37 PM                                 OPERATING ROOM DEPARTMENT                                                    Pathologist:           Russell Arevalo MD                                                      Specimen:    Bronchus, Subcarinal Fine Needle BX                                                       • Fungus Stain 09/29/2017 Final report   Final   • KOH/Calcofluor preparation 09/29/2017 Comment   Final   • Culture 09/29/2017 Final report   Final   • Fungus (Mycology) Result 1 09/29/2017 Comment   Final   • AFB Specimen Processing 10/13/2017 Concentration   Final   • Acid Fast Smear 10/13/2017 Negative   Final   • Culture 10/13/2017  Negative   Final   • Respiratory Culture 09/02/2017 Light growth (2+) Normal Respiratory Antonella   Final   • Gram Stain Result 09/02/2017 Moderate (3+) WBCs seen   Final   • Gram Stain Result 09/02/2017 Rare (1+) Epithelial cells seen   Final   • Gram Stain Result 09/02/2017 Few (2+) Gram positive cocci in pairs   Final       Assessment      ICD-10-CM ICD-9-CM   1. Lung nodule R91.1 793.11   2. Bronchiectasis without complication J47.9 494.0                DISCUSSION:  I had a good discussion with the patient.  This patient has an approximately 1.4 cm size nodule with a negative bronchoscopy but borderline hypermetabolic activities  in the PET scan.  It is quite possible that we are dealing with a benign nodule from fungal infection but patient was very much concerned about a neoplastic process.  We could not rule that out entirely particularly in view of the presence of hypermetabolic activities in the nodule.  I'm sending her for a CT scan of the chest now and compared that with the prior one.  Further management plan will depend on the findings.    Plan    Orders Placed This Encounter   Procedures   • CT Chest Without Contrast     No orders of the defined types were placed in this encounter.                 Ben Rizvi MD, FCCP, [facility]  Pulmonary, Critical Care, and Sleep Medicine

## 2018-08-30 ENCOUNTER — OFFICE VISIT (OUTPATIENT)
Dept: PULMONOLOGY | Facility: CLINIC | Age: 58
End: 2018-08-30

## 2018-08-30 VITALS
OXYGEN SATURATION: 97 % | DIASTOLIC BLOOD PRESSURE: 80 MMHG | WEIGHT: 167 LBS | BODY MASS INDEX: 25.31 KG/M2 | HEART RATE: 64 BPM | SYSTOLIC BLOOD PRESSURE: 163 MMHG | HEIGHT: 68 IN | TEMPERATURE: 97.8 F

## 2018-08-30 DIAGNOSIS — R91.1 LUNG NODULE: Primary | ICD-10-CM

## 2018-08-30 DIAGNOSIS — J47.9 BRONCHIECTASIS WITHOUT COMPLICATION (HCC): ICD-10-CM

## 2018-08-30 PROCEDURE — 99213 OFFICE O/P EST LOW 20 MIN: CPT | Performed by: INTERNAL MEDICINE

## 2018-08-30 RX ORDER — MELATONIN
1000 DAILY
COMMUNITY

## 2018-08-30 RX ORDER — ROSUVASTATIN CALCIUM 10 MG/1
10 TABLET, COATED ORAL DAILY
COMMUNITY

## 2018-08-30 NOTE — PROGRESS NOTES
Rain Pandey is a 58 y.o. female who is being seen for Lung Nodule    History of Present Illness   Patient returns for follow up for lung nodule.  This patient had a 1.4 cm size lung nodule, a bronchoscopy was negative and subsequently a PET scan showed mild hypermetabolic activities with SUV of 2.4.  Today she comes back for routine follow-up.  Does not have any new complaint.  Denies any weight loss or change in appetite.  She has cough with whitish sputum production more in the morning time but this is at her baseline.  Past Medical History:   Diagnosis Date   • Thyroid disease      Past Surgical History:   Procedure Laterality Date   • BRONCHOSCOPY N/A 8/31/2017    Procedure: BRONCHOSCOPY WITH ENDOBRONCHIAL ULTRASOUND;  Surgeon: Marcello Dupont MD;  Location: [facility];  Service:    • COLONOSCOPY  2011     Family History   Problem Relation Age of Onset   • Diabetes Mother    • Heart disease Sister    • Cancer Sister       reports that she has quit smoking. Her smoking use included Cigarettes. She has a 30.00 pack-year smoking history. She has never used smokeless tobacco. She reports that she does not drink alcohol or use drugs.  No Known Allergies        Patient states she has not received the flu and pneumonia vaccinations this year.     The following portions of the patient's history were reviewed and updated as appropriate: allergies, current medications, past family history, past medical history, past social history, past surgical history and problem list.    Review of Systems   Constitutional: Negative for appetite change, chills, diaphoresis and unexpected weight change.   HENT: Negative for sore throat, trouble swallowing and voice change.    Eyes: Negative for visual disturbance.   Respiratory: Negative for apnea, cough, choking, shortness of breath and wheezing.    Cardiovascular: Negative for chest pain, palpitations and leg swelling.   Gastrointestinal: Negative for abdominal pain,  "constipation, diarrhea, nausea and vomiting.   Endocrine: Negative for cold intolerance, heat intolerance, polydipsia, polyphagia and polyuria.   Genitourinary: Negative for difficulty urinating and dysuria.   Musculoskeletal: Negative for gait problem.   Skin: Negative for rash and wound.   Neurological: Negative for syncope and light-headedness.   Hematological: Negative for adenopathy.   Psychiatric/Behavioral: Negative for agitation, behavioral problems and confusion.   All other systems reviewed and are negative.      Objective   /80   Pulse 64   Temp 97.8 °F (36.6 °C)   Ht 172.7 cm (68\")   Wt 75.8 kg (167 lb)   SpO2 97%   BMI 25.39 kg/m²   Physical Exam   Constitutional: She is oriented to person, place, and time.   HENT:   Head: Normocephalic and atraumatic.   Nose: Mucosal edema present.   Eyes: Pupils are equal, round, and reactive to light. EOM are normal.   Neck: Neck supple.   Cardiovascular: Normal rate, regular rhythm and normal heart sounds.    Pulmonary/Chest: She has rhonchi.   Vesicular breath sound bilaterally with prolonged expiratory phase   Abdominal: Soft. Bowel sounds are normal.   Musculoskeletal: Normal range of motion. She exhibits no deformity.   Neurological: She is alert and oriented to person, place, and time.   Skin: Skin is warm and dry.   Psychiatric: She has a normal mood and affect. Her behavior is normal.   Nursing note and vitals reviewed.        Radiology:  No Images in the past 120 days found..    Lab Results:  Admission on 08/31/2017, Discharged on 08/31/2017   Component Date Value Ref Range Status   • Protime 08/31/2017 12.8  11.0 - 15.4 Seconds Final   • INR 08/31/2017 0.95  0.90 - 1.10 Final   • WBC 08/31/2017 4.88  4.50 - 12.50 10*3/mm3 Final   • RBC 08/31/2017 4.76  4.20 - 5.40 10*6/mm3 Final   • Hemoglobin 08/31/2017 14.9  12.0 - 16.0 g/dL Final   • Hematocrit 08/31/2017 45.8  37.0 - 47.0 % Final   • MCV 08/31/2017 96.2* 80.0 - 94.0 fL Final   • MCH " 08/31/2017 31.3  27.0 - 33.0 pg Final   • MCHC 08/31/2017 32.5* 33.0 - 37.0 g/dL Final   • RDW 08/31/2017 13.4  11.5 - 14.5 % Final   • RDW-SD 08/31/2017 45.9  37.0 - 54.0 fl Final   • MPV 08/31/2017 10.5* 6.0 - 10.0 fL Final   • Platelets 08/31/2017 181  130 - 400 10*3/mm3 Final   • Neutrophil % 08/31/2017 50.6  30.0 - 70.0 % Final   • Lymphocyte % 08/31/2017 36.1  21.0 - 51.0 % Final   • Monocyte % 08/31/2017 9.4  0.0 - 10.0 % Final   • Eosinophil % 08/31/2017 2.9  0.0 - 5.0 % Final   • Basophil % 08/31/2017 1.0  0.0 - 2.0 % Final   • Immature Grans % 08/31/2017 0.0  0.0 - 0.5 % Final   • Neutrophils, Absolute 08/31/2017 2.47  1.40 - 6.50 10*3/mm3 Final   • Lymphocytes, Absolute 08/31/2017 1.76  1.00 - 3.00 10*3/mm3 Final   • Monocytes, Absolute 08/31/2017 0.46  0.10 - 0.90 10*3/mm3 Final   • Eosinophils, Absolute 08/31/2017 0.14  0.00 - 0.70 10*3/mm3 Final   • Basophils, Absolute 08/31/2017 0.05  0.00 - 0.30 10*3/mm3 Final   • Immature Grans, Absolute 08/31/2017 0.00  0.00 - 0.03 10*3/mm3 Final   • Case Report 08/31/2017    Final                    Value: COR Non-Gyn Cytology                           Case: [id number]                                  Authorizing Provider:  Marcello Dupont MD         Collected:           08/31/2017 09:51 AM          Ordering Location:     [facility]      Received:            08/31/2017 01:36 PM                                 OPERATING ROOM DEPARTMENT                                                    Pathologist:           Russell Arevalo MD                                                      Specimens:   1) - Lung, Right Middle Lobe, RML Brushing                                                          2) - Bronchus, Bronch Wash                                                                          3) - Lung, Right Upper Lobe, RUL BAL                                                                4) - Lung, Right Lower Lobe, RLL BAL                                                                 5) - Lung, Right Middle Lobe, RML BAL                                                     • Fungus Stain 08/31/2017 Final report   Final   • KOH/Calcofluor preparation 08/31/2017 Comment   Final   • Culture 08/31/2017 Final report   Final   • Fungus (Mycology) Result 1 08/31/2017 Comment   Final   • AFB Specimen Processing 08/31/2017 Concentration   Final   • Acid Fast Smear 08/31/2017 Negative   Final   • Culture 08/31/2017 Negative   Final   • Respiratory Culture 08/31/2017 Light growth (2+) Normal Respiratory Antonella   Final   • Gram Stain Result 08/31/2017 Rare (1+) WBCs seen   Final   • Gram Stain Result 08/31/2017 No organisms seen   Final   • Fungus Stain 08/31/2017 Final report   Final   • KOH/Calcofluor preparation 08/31/2017 Comment   Final   • Culture 08/31/2017 Final report   Final   • Fungus (Mycology) Result 1 08/31/2017 Comment   Final   • AFB Specimen Processing 08/31/2017 Concentration   Final   • Acid Fast Smear 08/31/2017 Negative   Final   • Culture 08/31/2017 Negative   Final   • Respiratory Culture 08/31/2017 Scant growth (1+) Normal Respiratory Antonella   Final   • Gram Stain Result 08/31/2017 Rare (1+) WBCs seen   Final   • Gram Stain Result 08/31/2017 No organisms seen   Final   • Case Report 08/31/2017    Final                    Value: COR Fine Needle Aspirate                       Case: [id number]                                 Authorizing Provider:  Marcello Dupont MD         Collected:           08/31/2017 10:09 AM          Ordering Location:     [facility]      Received:            08/31/2017 01:37 PM                                 OPERATING ROOM DEPARTMENT                                                    Pathologist:           Russell Arevalo MD                                                      Specimen:    Bronchus, Subcarinal Fine Needle BX                                                       • Fungus Stain 08/31/2017 Final  report   Final   • KOH/Calcofluor preparation 08/31/2017 Comment   Final   • Culture 08/31/2017 Final report   Final   • Fungus (Mycology) Result 1 08/31/2017 Comment   Final   • AFB Specimen Processing 08/31/2017 Concentration   Final   • Acid Fast Smear 08/31/2017 Negative   Final   • Culture 08/31/2017 Negative   Final   • Respiratory Culture 08/31/2017 Light growth (2+) Normal Respiratory Antonella   Final   • Gram Stain Result 08/31/2017 Moderate (3+) WBCs seen   Final   • Gram Stain Result 08/31/2017 Rare (1+) Epithelial cells seen   Final   • Gram Stain Result 08/31/2017 Few (2+) Gram positive cocci in pairs   Final       Assessment      ICD-10-CM ICD-9-CM   1. Lung nodule R91.1 793.11   2. Bronchiectasis without complication (CMS/[facility]) J47.9 494.0                DISCUSSION:  This patient had a 1.4 cm lung nodule, bronchoscopy was negative but the PET scan was showing mild hypermetabolic activities with SUV of 2.4.  We wanted to repeat her CT scan during her last visit, and 6 months interval but that was not approved by his insurance company.  Her last CT scan was done in August 2017.  I like to repeat the scan again now to see the size and the shape of the nodule.  I like to reevaluate her again after that and further management plan will depend on the findings.    Patient has had findings consistent with bronchiectasis in her last CT scan.  So far symptom wise she is at her baseline, I advised her to continue her current medications.    Plan    Orders Placed This Encounter   Procedures   • CT Chest Without Contrast     No orders of the defined types were placed in this encounter.                 Ben Rizvi MD, FCCP, [facility]  Pulmonary, Critical Care, and Sleep Medicine

## 2022-04-29 ENCOUNTER — TRANSCRIBE ORDERS (OUTPATIENT)
Dept: ADMINISTRATIVE | Facility: HOSPITAL | Age: 62
End: 2022-04-29

## 2022-04-29 DIAGNOSIS — E04.1 NONTOXIC UNINODULAR GOITER: Primary | ICD-10-CM

## 2022-05-02 ENCOUNTER — HOSPITAL ENCOUNTER (OUTPATIENT)
Dept: ULTRASOUND IMAGING | Facility: HOSPITAL | Age: 62
Discharge: HOME | End: 2022-05-02
Admitting: INTERNAL MEDICINE

## 2022-05-02 DIAGNOSIS — E04.1 NONTOXIC UNINODULAR GOITER: ICD-10-CM

## 2022-05-02 PROCEDURE — 76536 US EXAM OF HEAD AND NECK: CPT

## 2022-05-02 PROCEDURE — 76536 US EXAM OF HEAD AND NECK: CPT | Performed by: RADIOLOGY

## 2023-08-14 ENCOUNTER — HOSPITAL ENCOUNTER (OUTPATIENT)
Dept: GENERAL RADIOLOGY | Facility: HOSPITAL | Age: 63
Discharge: HOME | End: 2023-08-14
Admitting: INTERNAL MEDICINE
Payer: COMMERCIAL

## 2023-08-14 ENCOUNTER — TRANSCRIBE ORDERS (OUTPATIENT)
Dept: ADMINISTRATIVE | Facility: HOSPITAL | Age: 63
End: 2023-08-14
Payer: COMMERCIAL

## 2023-08-14 DIAGNOSIS — M23.90 INTERNAL DERANGEMENT OF KNEE, UNSPECIFIED LATERALITY: Primary | ICD-10-CM

## 2023-08-14 DIAGNOSIS — M23.90 INTERNAL DERANGEMENT OF KNEE, UNSPECIFIED LATERALITY: ICD-10-CM

## 2023-08-14 PROCEDURE — 73565 X-RAY EXAM OF KNEES: CPT

## 2023-10-02 ENCOUNTER — TELEPHONE (OUTPATIENT)
Dept: ORTHOPEDIC SURGERY | Facility: CLINIC | Age: 63
End: 2023-10-02
Payer: COMMERCIAL

## 2023-10-02 NOTE — TELEPHONE ENCOUNTER
Hub staff attempted to follow warm transfer process and was unsuccessful     Caller: Deepali Pandey    Relationship to patient: Self    Best call back number: [phone]    Patient is needing: PATIENT RETURNING CALL FROM OFFICE REGARDING HER RESCHEDULED APPOINTMENT 10/09 WITH DR. DOWNING. PATIENT STATING RECEIVED ANOTHER CALL AFTER VISIT WAS RESCHEDULED FROM ASSOCIATE IN THE OFFICE STATING THAT IT MIGHT BE SOME ISSUES WITH THAT DATE/ TIME. PATIENT CALLING BACK TO CONFIRM. PLEASE FOLLOW UP WITH PATIENT REGARDING THIS.

## 2023-10-09 ENCOUNTER — OFFICE VISIT (OUTPATIENT)
Dept: ORTHOPEDIC SURGERY | Facility: CLINIC | Age: 63
End: 2023-10-09
Payer: COMMERCIAL

## 2023-10-09 VITALS
OXYGEN SATURATION: 95 % | BODY MASS INDEX: 30.62 KG/M2 | SYSTOLIC BLOOD PRESSURE: 142 MMHG | HEART RATE: 64 BPM | WEIGHT: 202 LBS | HEIGHT: 68 IN | DIASTOLIC BLOOD PRESSURE: 85 MMHG

## 2023-10-09 DIAGNOSIS — M25.562 BILATERAL CHRONIC KNEE PAIN: Primary | ICD-10-CM

## 2023-10-09 DIAGNOSIS — M17.0 PRIMARY OSTEOARTHRITIS OF BOTH KNEES: ICD-10-CM

## 2023-10-09 DIAGNOSIS — G89.29 BILATERAL CHRONIC KNEE PAIN: Primary | ICD-10-CM

## 2023-10-09 DIAGNOSIS — M25.561 BILATERAL CHRONIC KNEE PAIN: Primary | ICD-10-CM

## 2023-10-09 PROCEDURE — 99204 OFFICE O/P NEW MOD 45 MIN: CPT | Performed by: FAMILY MEDICINE

## 2023-10-09 RX ORDER — MELOXICAM 7.5 MG/1
7.5 TABLET ORAL DAILY
Qty: 30 TABLET | Refills: 1 | Status: SHIPPED | OUTPATIENT
Start: 2023-10-09

## 2023-10-09 RX ORDER — CETIRIZINE HYDROCHLORIDE 5 MG/1
5 TABLET ORAL DAILY
COMMUNITY

## 2023-10-09 RX ORDER — CELECOXIB 200 MG/1
CAPSULE ORAL
COMMUNITY
Start: 2023-07-24 | End: 2023-10-09

## 2023-10-09 NOTE — PROGRESS NOTES
New Patient Visit      Patient: Deepali Pandey  YOB: 1960  Date of Encounter: 10/09/2023  PCP: Skylar Butterfield MD  Referring Provider: No ref. provider found     Subjective   Deepali Pandey is a 63 y.o. female who presents to the office today for evaluation of Initial Evaluation and Pain of the Left Knee and Pain, Initial Evaluation, and Edema of the Right Knee      Chief Complaint   Patient presents with    Left Knee - Initial Evaluation, Pain    Right Knee - Pain, Initial Evaluation, Edema       HPI  Patient presents complaining of bilateral knee pain for greater than 1 year without injury.  It is worse on the right than the left and she gets swelling in the joint.  States that it is worse at night and first thing in the morning but she is very stiff and has endrange pain.  This gets better after ambulating worse with large amounts of activity.  States that she fell in a hole late in May which may have made it worse.  Stairs and uneven ground very difficult for her.  Wearing a brace has been of some help.  Taking Celebrex 200 mg daily has not been much help.  She is taking Osteo Bi-Flex supplements  Patient Active Problem List   Diagnosis    Bronchiectasis with acute exacerbation    Lung nodule       Past Medical History:   Diagnosis Date    High cholesterol     Hypertension     Thyroid disease        Past Surgical History:   Procedure Laterality Date    BRONCHOSCOPY N/A 8/31/2017    Procedure: BRONCHOSCOPY WITH ENDOBRONCHIAL ULTRASOUND;  Surgeon: Marcello Dupont MD;  Location: [facility];  Service:     COLONOSCOPY  2011       Family History   Problem Relation Age of Onset    Hypertension Mother     Diabetes Mother     Hypertension Sister     Heart disease Sister     Cancer Sister     Hypertension Brother        Social History     Socioeconomic History    Marital status:    Tobacco Use    Smoking status: Former     Packs/day: 1.00     Years: 30.00     Additional pack years: 0.00     Total pack  "years: 30.00     Types: Cigarettes     Quit date:      Years since quittin.7    Smokeless tobacco: Never   Vaping Use    Vaping Use: Never used   Substance and Sexual Activity    Alcohol use: No    Drug use: No    Sexual activity: Defer       Current Outpatient Medications   Medication Sig Dispense Refill    cetirizine (zyrTEC) 5 MG tablet Take 1 tablet by mouth Daily.      escitalopram (LEXAPRO) 10 MG tablet       lisinopril (PRINIVIL,ZESTRIL) 5 MG tablet 2 tablets.      Misc Natural Products (Osteo Bi-Flex Triple Strength) tablet Take  by mouth.      rosuvastatin (CRESTOR) 10 MG tablet Take 1 tablet by mouth Daily.      SYNTHROID 88 MCG tablet       cholecalciferol (VITAMIN D3) 1000 units tablet Take 1 tablet by mouth Daily.      meloxicam (MOBIC) 7.5 MG tablet Take 1 tablet by mouth Daily. 30 tablet 1     No current facility-administered medications for this visit.       No Known Allergies    Review of Systems   Constitutional:  Positive for activity change. Negative for fever.   Respiratory:  Negative for shortness of breath and wheezing.    Cardiovascular:  Negative for chest pain.   Musculoskeletal:  Positive for arthralgias, gait problem, joint swelling and myalgias.   Skin:  Negative for color change and wound.   Neurological:  Negative for weakness and numbness.       Visit Vitals  /85 (BP Location: Left arm, Patient Position: Sitting, Cuff Size: Adult)   Pulse 64   Ht 172.7 cm (68\")   Wt 91.6 kg (202 lb)   SpO2 95%   BMI 30.71 kg/m²     63 y.o.female  Physical Exam  Vitals and nursing note reviewed.   Constitutional:       General: She is not in acute distress.     Appearance: Normal appearance.   Pulmonary:      Effort: Pulmonary effort is normal. No respiratory distress.   Musculoskeletal:      Right knee: Effusion present. Decreased range of motion. Tenderness present over the medial joint line. No LCL laxity, MCL laxity, ACL laxity or PCL laxity. Normal pulse.      Instability Tests: " Anterior drawer test negative. Posterior drawer test negative. Anterior Lachman test negative. Medial Annalisa test negative and lateral Annalisa test negative.      Left knee: Effusion present. Decreased range of motion. Tenderness present over the medial joint line. No LCL laxity, MCL laxity, ACL laxity or PCL laxity.Normal pulse.      Instability Tests: Anterior drawer test negative. Posterior drawer test negative. Anterior Lachman test negative. Medial Annalisa test negative and lateral Annalisa test negative.      Comments: Trace left effusion 1+ right effusion.  Restricted flexion bilaterally with endrange pain.  No pain on resisted testing with intact strength.   Skin:     General: Skin is warm and dry.      Findings: No erythema.   Neurological:      General: No focal deficit present.      Mental Status: She is alert.      Sensory: No sensory deficit.      Motor: No weakness.         Radiology Results:    XR Knee Bilateral AP Standing  Narrative: EXAM:    XR Knee Bilateral AP Standing     EXAM DATE:    8/14/2023 12:45 PM     CLINICAL HISTORY:    ; M23.90-Unspecified internal derangement of unspecified knee     TECHNIQUE:    Anteroposterior x-ray of the bilateral knees with patient in a  standing position.     COMPARISON:    No relevant prior studies available.     FINDINGS:    Bones/joints:  Moderate-advanced osteoarthritis of the right greater  than left medial knee compartments.  Moderate osteoarthritis of the left  greater than right patellofemoral compartments.  Bilateral suprapatellar  joint effusions are noted, moderate in size.  No acute fracture.  No  dislocation.    Soft tissues:  Unremarkable.     Impression: 1.  Moderate-advanced osteoarthritis in the medial and patellofemoral  compartments as detailed above.  2.  Bilateral knee joint effusions.     This report was finalized on 8/14/2023 1:39 PM by Dr. Chi Aguayo MD.             Assessment & Plan   Diagnoses and all orders for this  visit:    1. Bilateral chronic knee pain (Primary)  -     meloxicam (MOBIC) 7.5 MG tablet; Take 1 tablet by mouth Daily.  Dispense: 30 tablet; Refill: 1    2. Primary osteoarthritis of both knees  -     meloxicam (MOBIC) 7.5 MG tablet; Take 1 tablet by mouth Daily.  Dispense: 30 tablet; Refill: 1         MEDS ORDERED DURING VISIT:  New Medications Ordered This Visit   Medications    meloxicam (MOBIC) 7.5 MG tablet     Sig: Take 1 tablet by mouth Daily.     Dispense:  30 tablet     Refill:  1     MEDICATION ISSUES:  Discussed medication options and treatment plan of prescribed medication as well as the risks, benefits, and side effects including potential falls, possible impaired driving and metabolic adversities among others. Patient is agreeable to call the office with any worsening of symptoms or onset of side effects. Patient is agreeable to call 911 or go to the nearest ER should he/she begin having SI/HI.     Discussion:  Pain appears to be coming largely from degenerative changes in the medial compartments of both knees.  Reviewed imaging with the patient  Discontinue Celebrex and will try meloxicam instead.  Patient is given home exercises to work on and will continue wearing her knee brace when being active.  Strongly consider further interventions if not improving at follow-up in 1 month.  Offered to examine wrist at next visit the patien says it does not bother her right now.             This document has been electronically signed by Tani Savage DO   October 9, 2023 13:52 EDT    Part of this note may be an electronic transcription/translation of spoken language to printed text using the Dragon Dictation System.

## 2023-11-09 ENCOUNTER — OFFICE VISIT (OUTPATIENT)
Dept: ORTHOPEDIC SURGERY | Facility: CLINIC | Age: 63
End: 2023-11-09
Payer: COMMERCIAL

## 2023-11-09 VITALS
BODY MASS INDEX: 30.62 KG/M2 | DIASTOLIC BLOOD PRESSURE: 78 MMHG | HEIGHT: 68 IN | OXYGEN SATURATION: 97 % | HEART RATE: 79 BPM | SYSTOLIC BLOOD PRESSURE: 163 MMHG | WEIGHT: 202 LBS

## 2023-11-09 DIAGNOSIS — M17.0 PRIMARY OSTEOARTHRITIS OF BOTH KNEES: ICD-10-CM

## 2023-11-09 DIAGNOSIS — M25.561 BILATERAL CHRONIC KNEE PAIN: Primary | ICD-10-CM

## 2023-11-09 DIAGNOSIS — G89.29 BILATERAL CHRONIC KNEE PAIN: Primary | ICD-10-CM

## 2023-11-09 DIAGNOSIS — M25.562 BILATERAL CHRONIC KNEE PAIN: Primary | ICD-10-CM

## 2023-11-09 RX ADMIN — LIDOCAINE HYDROCHLORIDE 5 ML: 10 INJECTION, SOLUTION EPIDURAL; INFILTRATION; INTRACAUDAL; PERINEURAL at 13:56

## 2023-11-09 RX ADMIN — METHYLPREDNISOLONE ACETATE 40 MG: 40 INJECTION, SUSPENSION INTRA-ARTICULAR; INTRALESIONAL; INTRAMUSCULAR; SOFT TISSUE at 13:56

## 2023-11-09 NOTE — PROGRESS NOTES
"Follow Up Visit      Patient: Deepali Pandey  YOB: 1960  Date of Encounter: 11/09/2023  PCP: Skylar Butterfield MD  Referring Provider: No ref. provider found     Subjective   Deepali Pandey is a 63 y.o. female who presents to the office today for evaluation of Follow-up and Pain of the Right Knee and Follow-up and Pain of the Left Knee      Chief Complaint   Patient presents with    Right Knee - Follow-up, Pain    Left Knee - Follow-up, Pain       HPI  The patient presents for follow-up of bilateral knee pain. She was switched from NSAID to Mobic at the last visit and has been doing home exercises as best she can. Overall, her pain is the same, worse on the right knee. Continues to experience pain in the medial part of both joints. She tried some over the counter Voltaren gel which provided her with significant relief. The patient is interested in further interventions to give her longer lasting relief.    Patient Active Problem List   Diagnosis    Bronchiectasis with acute exacerbation    Lung nodule       Past Medical History:   Diagnosis Date    High cholesterol     Hypertension     Thyroid disease        No Known Allergies    Review of Systems   Constitutional:  Positive for activity change. Negative for fever.   Respiratory:  Negative for shortness of breath and wheezing.    Cardiovascular:  Negative for chest pain.   Musculoskeletal:  Positive for arthralgias and myalgias.   Skin:  Negative for color change and wound.   Neurological:  Negative for weakness and numbness.     Visit Vitals  /78 (BP Location: Left arm, Patient Position: Sitting, Cuff Size: Adult)   Pulse 79   Ht 172.7 cm (67.99\")   Wt 91.6 kg (202 lb)   SpO2 97%   BMI 30.72 kg/m²     63 y.o.female  Physical Exam  Vitals and nursing note reviewed.   Constitutional:       General: She is not in acute distress.     Appearance: Normal appearance.   Pulmonary:      Effort: Pulmonary effort is normal. No respiratory distress. "   Musculoskeletal:      Right knee: Effusion present. Decreased range of motion. Tenderness present over the medial joint line. No LCL laxity, MCL laxity, ACL laxity or PCL laxity. Normal pulse.      Instability Tests: Anterior drawer test negative. Posterior drawer test negative. Anterior Lachman test negative. Medial Annalisa test negative and lateral Annalisa test negative.      Left knee: Effusion present. Decreased range of motion. Tenderness present over the medial joint line. No LCL laxity, MCL laxity, ACL laxity or PCL laxity.Normal pulse.      Instability Tests: Anterior drawer test negative. Posterior drawer test negative. Anterior Lachman test negative. Medial Annalisa test negative and lateral Annalisa test negative.      Comments: Trace left effusion 1+ right effusion.  Restricted flexion bilaterally with endrange pain.  No pain on resisted testing with intact strength.    Exam has not changed from previous visit   Skin:     General: Skin is warm and dry.      Findings: No erythema.   Neurological:      General: No focal deficit present.      Mental Status: She is alert.      Sensory: No sensory deficit.      Motor: No weakness.       Radiology Results:    Mammo Screening Digital Tomosynthesis Bilateral With CAD    Result Date: 10/10/2023  FINAL IMPRESSION: ACR BI-RADS 1: Negative. RECOMMENDATIONS: Routine annual screening mammography. A letter including results and recommendations was sent to the patient. Density notification was provided to patients with type 3 or 4 breast tissue pattern. Patient information entered into a reminder system with a target due date for the next mammogram. At our facility, a [place] marker is positioned over a visible skin lesion and a linear marker is used to indicate a scar. A triangular marker is placed on a self reported palpable finding. Note: Mammography does not detect approximately 10-15% of breast cancers. An annual clinical breast exam by the patient's breast care  physician and regular monthly self breast exams by the patient are integral parts of breast cancer screening, in addition to annual mammography. A normal mammogram does not completely exclude the presence of breast cancer, especially if there is an abnormal finding on physical exam. When clinically indicated, a biopsy should not be deferred because of a normal mammogram report. cc:    Large Joint Arthrocentesis: R knee  Date/Time: 11/9/2023 1:56 PM  Consent given by: patient  Site marked: site marked  Timeout: Immediately prior to procedure a time out was called to verify the correct patient, procedure, equipment, support staff and site/side marked as required   Supporting Documentation  Indications: pain   Procedure Details  Location: knee - R knee  Preparation: Patient was prepped in usual sterile fashion.  Needle size: 22 G  Approach: anterolateral (infrapatellar)  Medications administered: 5 mL lidocaine PF 1% 1 %; 40 mg methylPREDNISolone acetate 40 MG/ML  Patient tolerance: patient tolerated the procedure well with no immediate complications     Injection site on lateral knee was cleaned with alcohol and iodine and anesthesia provided with ethyl chloride.  Then using 22-gauge 1.5 inch needle the knee joint was accessed and a mixture of 5 cc of 1% lidocaine and 40 mg of methylprednisolone were injected.  Area was covered with sterile bandage.  Follow-up care precautions were discussed.  There were no adverse effects and negligible blood loss.      Assessment & Plan   Diagnoses and all orders for this visit:    1. Bilateral chronic knee pain (Primary)  -     Diclofenac Sodium (VOLTAREN) 1 % gel gel; Apply 4 g topically to the appropriate area as directed 4 (Four) Times a Day As Needed (knee pain).  Dispense: 350 g; Refill: 2  -     Large Joint Arthrocentesis: R knee    2. Primary osteoarthritis of both knees  -     Diclofenac Sodium (VOLTAREN) 1 % gel gel; Apply 4 g topically to the appropriate area as directed  4 (Four) Times a Day As Needed (knee pain).  Dispense: 350 g; Refill: 2  -     Large Joint Arthrocentesis: R knee         MEDS ORDERED DURING VISIT:  New Medications Ordered This Visit   Medications    Diclofenac Sodium (VOLTAREN) 1 % gel gel     Sig: Apply 4 g topically to the appropriate area as directed 4 (Four) Times a Day As Needed (knee pain).     Dispense:  350 g     Refill:  2     MEDICATION ISSUES:  Discussed medication options and treatment plan of prescribed medication as well as the risks, benefits, and side effects including potential falls, possible impaired driving and metabolic adversities among others. Patient is agreeable to call the office with any worsening of symptoms or onset of side effects. Patient is agreeable to call 911 or go to the nearest ER should he/she begin having SI/HI.     Discussion:    I discussed treatment options with patient including physical therapy, medication changes, injections, and others. She has elected to go with a steroid injection in  in the right knee, which is the worst, to try to obtain some relief. The pain is bothering her considerably at night and keeping her awake. I also recommend using Voltaren gel regularly and sent in a prescription for this. If it works well enough, she may even be able to stop the Mobic.     Follow up in 2 weeks for further evaluation and management.           This document has been electronically signed by Tani Savage DO   November 9, 2023 13:56 EST    Dictated Utilizing Dragon Dictation: Part of this note may be an electronic transcription/translation of spoken language to printed text using the Dragon Dictation System.    Transcribed from ambient dictation for Tani Savage DO by Flaquita Munoz.  11/09/23   20:54 EST    I have personally performed the services described in this document as transcribed by the above individual, and it is both accurate and complete.

## 2023-11-12 RX ORDER — METHYLPREDNISOLONE ACETATE 40 MG/ML
40 INJECTION, SUSPENSION INTRA-ARTICULAR; INTRALESIONAL; INTRAMUSCULAR; SOFT TISSUE
Status: COMPLETED | OUTPATIENT
Start: 2023-11-09 | End: 2023-11-09

## 2023-11-12 RX ORDER — LIDOCAINE HYDROCHLORIDE 10 MG/ML
5 INJECTION, SOLUTION EPIDURAL; INFILTRATION; INTRACAUDAL; PERINEURAL
Status: COMPLETED | OUTPATIENT
Start: 2023-11-09 | End: 2023-11-09

## 2024-04-03 ENCOUNTER — OFFICE VISIT (OUTPATIENT)
Dept: ORTHOPEDIC SURGERY | Facility: CLINIC | Age: 64
End: 2024-04-03
Payer: MEDICARE

## 2024-04-03 VITALS
SYSTOLIC BLOOD PRESSURE: 118 MMHG | WEIGHT: 202 LBS | DIASTOLIC BLOOD PRESSURE: 79 MMHG | OXYGEN SATURATION: 97 % | BODY MASS INDEX: 30.62 KG/M2 | HEART RATE: 65 BPM | HEIGHT: 68 IN

## 2024-04-03 DIAGNOSIS — M25.562 BILATERAL CHRONIC KNEE PAIN: Primary | ICD-10-CM

## 2024-04-03 DIAGNOSIS — M25.561 BILATERAL CHRONIC KNEE PAIN: Primary | ICD-10-CM

## 2024-04-03 DIAGNOSIS — G89.29 BILATERAL CHRONIC KNEE PAIN: Primary | ICD-10-CM

## 2024-04-03 DIAGNOSIS — M17.0 PRIMARY OSTEOARTHRITIS OF BOTH KNEES: ICD-10-CM

## 2024-04-03 RX ORDER — SENNOSIDES 8.6 MG
650 CAPSULE ORAL EVERY 8 HOURS PRN
COMMUNITY

## 2024-04-03 RX ADMIN — METHYLPREDNISOLONE ACETATE 80 MG: 80 INJECTION, SUSPENSION INTRA-ARTICULAR; INTRALESIONAL; INTRAMUSCULAR; SOFT TISSUE at 14:04

## 2024-04-03 RX ADMIN — LIDOCAINE HYDROCHLORIDE 5 ML: 10 INJECTION, SOLUTION EPIDURAL; INFILTRATION; INTRACAUDAL; PERINEURAL at 14:04

## 2024-04-03 NOTE — PROGRESS NOTES
"Follow Up Visit      Patient: Deepali Pandey  YOB: 1960  Date of Encounter: 04/03/2024  PCP: Skylar Butterfield MD  Referring Provider: No ref. provider found     Subjective   Deepali Pandey is a 64 y.o. female who presents to the office today for evaluation of Follow-up and Pain of the Right Knee and Follow-up and Pain of the Left Knee      Chief Complaint   Patient presents with    Right Knee - Follow-up, Pain    Left Knee - Follow-up, Pain       HPI  The patient presents for follow-up for chronic bilateral knee pain. The patient was last seen on 11/09/2023, and she had a steroid injection in the right knee. She notes that she had several weeks of relief before it wore off and that it was about 40 percent better during that time; however, now, it has completely worn off, and the pain is the same as it was. The patient is interested in further treatment, as she is getting into the warm season and wants to be more active. She states that the knees are not particularly bothering her today, but she has not done anything to get them stirred up and that it is mainly with activity that she has issues.      Patient Active Problem List   Diagnosis    Bronchiectasis with acute exacerbation    Lung nodule       Past Medical History:   Diagnosis Date    High cholesterol     Hypertension     Thyroid disease        No Known Allergies    Vitals:   /79 (BP Location: Left arm, Patient Position: Sitting, Cuff Size: Adult)   Pulse 65   Ht 172.7 cm (67.99\")   Wt 91.6 kg (202 lb)   SpO2 97%   BMI 30.72 kg/m²           Visit Vitals  /79 (BP Location: Left arm, Patient Position: Sitting, Cuff Size: Adult)   Pulse 65   Ht 172.7 cm (67.99\")   Wt 91.6 kg (202 lb)   SpO2 97%   BMI 30.72 kg/m²     64 y.o.female  Physical Exam  Vitals and nursing note reviewed.   Constitutional:       General: She is not in acute distress.     Appearance: Normal appearance.   Pulmonary:      Effort: Pulmonary effort is normal. No " respiratory distress.   Musculoskeletal:      Right knee: No effusion. Decreased range of motion. No tenderness. No LCL laxity, MCL laxity, ACL laxity or PCL laxity. Normal pulse.      Instability Tests: Medial Annalisa test negative and lateral Annalisa test negative.      Left knee: No effusion. Decreased range of motion. No tenderness. No LCL laxity, MCL laxity, ACL laxity or PCL laxity.Normal pulse.      Instability Tests: Medial Annalisa test negative and lateral Annalisa test negative.      Comments: Cannot recreate pain on exam today. Occurs with activity   Skin:     General: Skin is warm and dry.      Findings: No erythema.   Neurological:      General: No focal deficit present.      Mental Status: She is alert.      Sensory: No sensory deficit.      Motor: No weakness.       - Large Joint Arthrocentesis: L knee on 4/3/2024 2:04 PM  Indications: pain and joint swelling  Details: 22 G needle, lateral approach  Medications: 5 mL lidocaine PF 1% 1 %; 80 mg methylPREDNISolone acetate 80 MG/ML  Outcome: tolerated well, no immediate complications    Injection site on lateral infrapatellar knee was cleaned with alcohol and iodine and anesthesia provided with ethyl chloride.  Then using 22-gauge 1.5 inch needle the knee joint was accessed and a mixture of 5 cc of 1% lidocaine and 80 mg of methylprednisolone were injected.  Area was covered with sterile bandage.  Follow-up care precautions were discussed.  There were no adverse effects and negligible blood loss.        Consent was given by the patient. Immediately prior to procedure a time out was called to verify the correct patient, procedure, equipment, support staff and site/side marked as required. Patient was prepped and draped in the usual sterile fashion.          Radiology Results:    US thyroid    Result Date: 3/20/2024  1. Lobular thyroid with subcentimeter hyperechoic nodules bilaterally. This may just be the normal gland and appears overall stable from  prior. Images reviewed, interpreted, and dictated by Pamela Bay MD     Assessment & Plan   Diagnoses and all orders for this visit:    1. Bilateral chronic knee pain (Primary)  -     - Large Joint Arthrocentesis: L knee    2. Primary osteoarthritis of both knees  -     - Large Joint Arthrocentesis: L knee         MEDS ORDERED DURING VISIT:  No orders of the defined types were placed in this encounter.    MEDICATION ISSUES:  Discussed medication options and treatment plan of prescribed medication as well as the risks, benefits, and side effects including potential falls, possible impaired driving and metabolic adversities among others. Patient is agreeable to call the office with any worsening of symptoms or onset of side effects. Patient is agreeable to call 911 or go to the nearest ER should he/she begin having SI/HI.     Discussion:  Discussed ongoing treatment options with the patient. She is interested in further treatment for the knees and would like to hold off on any type of surgery if possible. Discussed physical therapy, injections, medications, and home exercises. The patient has not been doing her home exercises diligently. Recommend she start doing these daily and elected to receive further knee injections. The patient received a steroid injection in the left knee today without adverse effect and notable improved pain, and she will be precertified for right knee viscosupplementation injection and will follow up to receive it.             This document has been electronically signed by Tani Savage DO   April 3, 2024 17:39 EDT    Dictated Utilizing Dragon Dictation: Part of this note may be an electronic transcription/translation of spoken language to printed text using the Dragon Dictation System.      Transcribed from ambient dictation for Tani Savage DO by Chino Kinsey.  04/03/24   18:06 EDT    I have personally performed the services described in this document as transcribed by the  above individual, and it is both accurate and complete.

## 2024-04-10 RX ORDER — LIDOCAINE HYDROCHLORIDE 10 MG/ML
5 INJECTION, SOLUTION EPIDURAL; INFILTRATION; INTRACAUDAL; PERINEURAL
Status: COMPLETED | OUTPATIENT
Start: 2024-04-03 | End: 2024-04-03

## 2024-04-10 RX ORDER — METHYLPREDNISOLONE ACETATE 80 MG/ML
80 INJECTION, SUSPENSION INTRA-ARTICULAR; INTRALESIONAL; INTRAMUSCULAR; SOFT TISSUE
Status: COMPLETED | OUTPATIENT
Start: 2024-04-03 | End: 2024-04-03

## 2024-04-19 ENCOUNTER — TELEPHONE (OUTPATIENT)
Dept: ORTHOPEDIC SURGERY | Facility: CLINIC | Age: 64
End: 2024-04-19
Payer: MEDICARE

## 2024-04-19 NOTE — TELEPHONE ENCOUNTER
Caller: Deepali Pandey    Relationship to patient: Self    Best call back number: [phone]    Chief complaint: BILATERAL KNEE     Type of visit: [place]     Requested date: SOONER    If rescheduling, when is the original appointment: 04/25/2024

## 2024-04-22 ENCOUNTER — CLINICAL SUPPORT (OUTPATIENT)
Dept: ORTHOPEDIC SURGERY | Facility: CLINIC | Age: 64
End: 2024-04-22
Payer: MEDICARE

## 2024-04-22 VITALS
WEIGHT: 202 LBS | BODY MASS INDEX: 30.62 KG/M2 | HEART RATE: 82 BPM | HEIGHT: 68 IN | OXYGEN SATURATION: 96 % | SYSTOLIC BLOOD PRESSURE: 120 MMHG | DIASTOLIC BLOOD PRESSURE: 74 MMHG

## 2024-04-22 DIAGNOSIS — G89.29 BILATERAL CHRONIC KNEE PAIN: ICD-10-CM

## 2024-04-22 DIAGNOSIS — M25.561 BILATERAL CHRONIC KNEE PAIN: ICD-10-CM

## 2024-04-22 DIAGNOSIS — M17.0 PRIMARY OSTEOARTHRITIS OF BOTH KNEES: ICD-10-CM

## 2024-04-22 DIAGNOSIS — M25.562 BILATERAL CHRONIC KNEE PAIN: ICD-10-CM

## 2024-04-22 DIAGNOSIS — S80.261A TICK BITE OF RIGHT KNEE, INITIAL ENCOUNTER: Primary | ICD-10-CM
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
## 2024-04-22 PROCEDURE — 99214 OFFICE O/P EST MOD 30 MIN: CPT | Performed by: FAMILY MEDICINE

## 2024-04-22 RX ORDER — DOXYCYCLINE HYCLATE 100 MG/1
200 CAPSULE ORAL ONCE
Qty: 2 CAPSULE | Refills: 0 | Status: SHIPPED | OUTPATIENT
Start: 2024-04-22 | End: 2024-04-22

## 2024-04-22 NOTE — PROGRESS NOTES
"Follow Up Visit      Patient: Deepali Pandey  YOB: 1960  Date of Encounter: 04/22/2024  PCP: Skylar Butterfield MD  Referring Provider: No ref. provider found     Subjective   Deepali Pandey is a 64 y.o. female who presents to the office today for evaluation of Follow-up and Pain of the Right Knee and Follow-up and Pain of the Left Knee      Chief Complaint   Patient presents with    Right Knee - Follow-up, Pain    Left Knee - Follow-up, Pain       HPI  Patient presents for right knee Durolane injection but complains of a tick bite.  States that she thinks she was bitten by the tick on Saturday and found it behind her knee on Sunday.  States that it was very small and her  had to pull it off.  States it is red and itchy behind the knee now.  Does not think the tick was engorged but states that it was too small to tell.  Denies any fevers or change in her pre-existing joint pain    States left knee did well with injection but has been having some pain in the shin  Patient Active Problem List   Diagnosis    Bronchiectasis with acute exacerbation    Lung nodule       Past Medical History:   Diagnosis Date    High cholesterol     Hypertension     Knee swelling     Thyroid disease        No Known Allergies    Vitals:   /74 (BP Location: Left arm, Patient Position: Sitting, Cuff Size: Adult)   Pulse 82   Ht 172.7 cm (67.99\")   Wt 91.6 kg (202 lb)   SpO2 96%   BMI 30.72 kg/m²       Review of Systems   Constitutional:  Positive for activity change. Negative for fever.   Respiratory:  Negative for shortness of breath and wheezing.    Cardiovascular:  Negative for chest pain.   Musculoskeletal:  Positive for arthralgias and myalgias.   Skin:  Positive for color change and rash. Negative for wound.   Neurological:  Negative for weakness and numbness.       Visit Vitals  /74 (BP Location: Left arm, Patient Position: Sitting, Cuff Size: Adult)   Pulse 82   Ht 172.7 cm (67.99\")   Wt 91.6 kg (202 " lb)   SpO2 96%   BMI 30.72 kg/m²     64 y.o.female  Physical Exam  Vitals and nursing note reviewed.   Constitutional:       General: She is not in acute distress.     Appearance: Normal appearance.   Pulmonary:      Effort: Pulmonary effort is normal. No respiratory distress.   Musculoskeletal:      Right knee: No effusion. Decreased range of motion. Tenderness present.      Left knee: No effusion. Decreased range of motion. No tenderness.      Left lower leg: No swelling or tenderness.   Skin:     General: Skin is warm and dry.      Findings: Erythema present.             Comments: Localized erythema in right popliteal fossa without acute cellulitis or true erythema migrans/bull's-eye lesion   Neurological:      General: No focal deficit present.      Mental Status: She is alert.      Sensory: No sensory deficit.      Motor: No weakness.         Radiology Results:        Assessment & Plan   Diagnoses and all orders for this visit:    1. Tick bite of right knee, initial encounter (Primary)  -     doxycycline (VIBRAMYCIN) 100 MG capsule; Take 2 capsules by mouth 1 (One) Time for 1 dose. Take with food  Dispense: 2 capsule; Refill: 0    2. Bilateral chronic knee pain    3. Primary osteoarthritis of both knees         MEDS ORDERED DURING VISIT:  New Medications Ordered This Visit   Medications    doxycycline (VIBRAMYCIN) 100 MG capsule     Sig: Take 2 capsules by mouth 1 (One) Time for 1 dose. Take with food     Dispense:  2 capsule     Refill:  0     MEDICATION ISSUES:  Discussed medication options and treatment plan of prescribed medication as well as the risks, benefits, and side effects including potential falls, possible impaired driving and metabolic adversities among others. Patient is agreeable to call the office with any worsening of symptoms or onset of side effects. Patient is agreeable to call 911 or go to the nearest ER should he/she begin having SI/HI.     Discussion:  Patient had a tick bite that was on  for approximately 24 hours.  She states it was too small to tell if it was engorged.  She does have some localized erythema without true erythema migrans.  Recommend doxycycline prophylaxis for Lyme disease.  Will delay Duralane Injection for 1 week             This document has been electronically signed by Tani Savage DO   April 22, 2024 14:34 EDT    Dictated Utilizing Dragon Dictation: Part of this note may be an electronic transcription/translation of spoken language to printed text using the Dragon Dictation System.

## 2024-04-30 ENCOUNTER — OFFICE VISIT (OUTPATIENT)
Dept: ORTHOPEDIC SURGERY | Facility: CLINIC | Age: 64
End: 2024-04-30
Payer: MEDICARE

## 2024-04-30 VITALS
OXYGEN SATURATION: 96 % | RESPIRATION RATE: 18 BRPM | DIASTOLIC BLOOD PRESSURE: 81 MMHG | HEART RATE: 71 BPM | BODY MASS INDEX: 29.28 KG/M2 | WEIGHT: 193.2 LBS | SYSTOLIC BLOOD PRESSURE: 133 MMHG | HEIGHT: 68 IN

## 2024-04-30 DIAGNOSIS — S80.261D TICK BITE OF RIGHT KNEE, SUBSEQUENT ENCOUNTER: ICD-10-CM
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
## 2024-04-30 DIAGNOSIS — G89.29 BILATERAL CHRONIC KNEE PAIN: Primary | ICD-10-CM

## 2024-04-30 DIAGNOSIS — M17.0 PRIMARY OSTEOARTHRITIS OF BOTH KNEES: ICD-10-CM

## 2024-04-30 DIAGNOSIS — M25.561 BILATERAL CHRONIC KNEE PAIN: Primary | ICD-10-CM

## 2024-04-30 DIAGNOSIS — M25.562 BILATERAL CHRONIC KNEE PAIN: Primary | ICD-10-CM

## 2024-04-30 PROCEDURE — 99213 OFFICE O/P EST LOW 20 MIN: CPT | Performed by: FAMILY MEDICINE

## 2024-04-30 PROCEDURE — 20610 DRAIN/INJ JOINT/BURSA W/O US: CPT | Performed by: FAMILY MEDICINE

## 2024-04-30 RX ORDER — LIDOCAINE HYDROCHLORIDE 10 MG/ML
5 INJECTION, SOLUTION EPIDURAL; INFILTRATION; INTRACAUDAL; PERINEURAL
Status: COMPLETED | OUTPATIENT
Start: 2024-04-30 | End: 2024-04-30

## 2024-04-30 RX ADMIN — LIDOCAINE HYDROCHLORIDE 5 ML: 10 INJECTION, SOLUTION EPIDURAL; INFILTRATION; INTRACAUDAL; PERINEURAL at 15:27

## 2024-04-30 NOTE — PROGRESS NOTES
"Follow Up Visit      Patient: Deepali Pandey  YOB: 1960  Date of Encounter: 04/30/2024  PCP: Skylar Butterfield MD  Referring Provider: No ref. provider found     Subjective   Deepali Pandey is a 64 y.o. female who presents to the office today for evaluation of Follow-up of the Left Knee and Follow-up of the Right Knee      Chief Complaint   Patient presents with    Left Knee - Follow-up    Right Knee - Follow-up       HPI  Patient presents for follow-up for chronic knee pain right knee tick bite.  Patient did not get the doxycycline that was sent in for her until for 5 days later and the red spot on her knee had already resolved so she did not take it.  The medicine was sent to her mail order pharmacy.  Patient denies any new signs of redness pain or swelling or new rashes or bull's-eye lesions.  Knees continue to be painful patient desires to go ahead with her Durolane injection.  Patient Active Problem List   Diagnosis    Bronchiectasis with acute exacerbation    Lung nodule       Past Medical History:   Diagnosis Date    High cholesterol     Hypertension     Knee swelling     Thyroid disease        No Known Allergies    Vitals:   /81 (BP Location: Left arm, Patient Position: Sitting, Cuff Size: Adult)   Pulse 71   Resp 18   Ht 172.7 cm (67.99\")   Wt 87.6 kg (193 lb 3.2 oz)   SpO2 96%   BMI 29.38 kg/m²         Visit Vitals  /81 (BP Location: Left arm, Patient Position: Sitting, Cuff Size: Adult)   Pulse 71   Resp 18   Ht 172.7 cm (67.99\")   Wt 87.6 kg (193 lb 3.2 oz)   SpO2 96%   BMI 29.38 kg/m²     64 y.o.female  Physical Exam  Vitals and nursing note reviewed.   Constitutional:       General: She is not in acute distress.     Appearance: Normal appearance.   Pulmonary:      Effort: Pulmonary effort is normal. No respiratory distress.   Musculoskeletal:      Right knee: No effusion. Decreased range of motion. Tenderness present.      Left knee: No effusion. Decreased range of motion. No " tenderness.      Left lower leg: No swelling or tenderness.   Skin:     General: Skin is warm and dry.      Findings: No erythema or rash.             Comments: erythema in right popliteal fossa is resolved.  No sign of new rash or erythema migrans/bull's-eye lesion   Neurological:      General: No focal deficit present.      Mental Status: She is alert.      Sensory: No sensory deficit.      Motor: No weakness.     - Large Joint Arthrocentesis: R knee on 4/30/2024 3:27 PM  Indications: pain  Details: 20 G needle, anterolateral approach  Medications: 5 mL lidocaine PF 1% 1 %; 60 mg Sodium Hyaluronate 60 MG/3ML  Outcome: tolerated well, no immediate complications    Injection site on the lateral knee was cleaned with alcohol and iodine and anesthesia was provided with ethyl chloride spray.  Then 2 cc of 1% lidocaine without epinephrine was injected into the subcutaneous tissues using a 1.5 inch 27-gauge needle for anesthesia.  Then using sterile technique and a 20-gauge 1.5 inch needle the knee joint was accessed and further 2 cc of 1% lidocaine was injected to confirm access to the joint.  Using sterile technique the syringe was switched and 60mg of Durolane was injected.  The injection site was covered with sterile bandage.  There is no adverse effects and negligible  blood loss.  Follow-up care and precautions were discussed.      Consent was given by the patient. Immediately prior to procedure a time out was called to verify the correct patient, procedure, equipment, support staff and site/side marked as required. Patient was prepped and draped in the usual sterile fashion.            Radiology Results:        Assessment & Plan   Diagnoses and all orders for this visit:    1. Bilateral chronic knee pain (Primary)    2. Primary osteoarthritis of both knees    3. Tick bite of right knee, subsequent encounter    Other orders  -     - Large Joint Arthrocentesis         MEDS ORDERED DURING VISIT:  No orders of the  defined types were placed in this encounter.    MEDICATION ISSUES:  Discussed medication options and treatment plan of prescribed medication as well as the risks, benefits, and side effects including potential falls, possible impaired driving and metabolic adversities among others. Patient is agreeable to call the office with any worsening of symptoms or onset of side effects. Patient is agreeable to call 911 or go to the nearest ER should he/she begin having SI/HI.     Discussion:  Patient received right knee Durolane injection today in the office without adverse effects.  Follow-up care precautions were discussed she will follow-up in 1 week for reevaluation.             This document has been electronically signed by Tani Savage DO   April 30, 2024 15:27 EDT    Dictated Utilizing Dragon Dictation: Part of this note may be an electronic transcription/translation of spoken language to printed text using the Dragon Dictation System.

## 2024-05-07 ENCOUNTER — OFFICE VISIT (OUTPATIENT)
Dept: ORTHOPEDIC SURGERY | Facility: CLINIC | Age: 64
End: 2024-05-07
Payer: MEDICARE

## 2024-05-07 VITALS
DIASTOLIC BLOOD PRESSURE: 74 MMHG | HEART RATE: 66 BPM | OXYGEN SATURATION: 97 % | SYSTOLIC BLOOD PRESSURE: 112 MMHG | HEIGHT: 68 IN | BODY MASS INDEX: 29.3 KG/M2 | WEIGHT: 193.3 LBS

## 2024-05-07 DIAGNOSIS — G89.29 BILATERAL CHRONIC KNEE PAIN: Primary | ICD-10-CM

## 2024-05-07 DIAGNOSIS — M17.0 PRIMARY OSTEOARTHRITIS OF BOTH KNEES: ICD-10-CM

## 2024-05-07 DIAGNOSIS — S80.261D TICK BITE OF RIGHT KNEE, SUBSEQUENT ENCOUNTER: ICD-10-CM
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
## 2024-05-07 DIAGNOSIS — M25.561 BILATERAL CHRONIC KNEE PAIN: Primary | ICD-10-CM

## 2024-05-07 DIAGNOSIS — M25.562 BILATERAL CHRONIC KNEE PAIN: Primary | ICD-10-CM

## 2024-05-07 PROCEDURE — 99213 OFFICE O/P EST LOW 20 MIN: CPT | Performed by: FAMILY MEDICINE

## 2025-04-02 ENCOUNTER — TELEPHONE (OUTPATIENT)
Dept: ORTHOPEDIC SURGERY | Facility: CLINIC | Age: 65
End: 2025-04-02
Payer: MEDICARE

## 2025-04-02 NOTE — TELEPHONE ENCOUNTER
Caller: Deepali Pandey    Relationship to patient: Self    Best call back number: [phone]    Chief complaint: BRUNO KNEE PAIN    Type of visit: FOLLOW UP    Requested date: ASAP       Additional notes:PT WOULD LIKE TO SCHEDULE AN APPOINTMENT FOR HER GEL INJECTION    LAST INJ- 4.30.24     PLEASE CONTACT PT AND ADVISE

## 2025-04-08 ENCOUNTER — HOSPITAL ENCOUNTER (OUTPATIENT)
Dept: GENERAL RADIOLOGY | Facility: HOSPITAL | Age: 65
Discharge: HOME | End: 2025-04-08
Payer: MEDICARE

## 2025-04-08 ENCOUNTER — OFFICE VISIT (OUTPATIENT)
Dept: ORTHOPEDIC SURGERY | Facility: CLINIC | Age: 65
End: 2025-04-08
Payer: MEDICARE

## 2025-04-08 VITALS
BODY MASS INDEX: 28.64 KG/M2 | HEART RATE: 73 BPM | OXYGEN SATURATION: 96 % | RESPIRATION RATE: 18 BRPM | WEIGHT: 189 LBS | HEIGHT: 68 IN | SYSTOLIC BLOOD PRESSURE: 119 MMHG | DIASTOLIC BLOOD PRESSURE: 75 MMHG

## 2025-04-08 DIAGNOSIS — M25.561 BILATERAL CHRONIC KNEE PAIN: Primary | ICD-10-CM

## 2025-04-08 DIAGNOSIS — M79.671 PAIN OF RIGHT HEEL: ICD-10-CM

## 2025-04-08 DIAGNOSIS — M17.0 PRIMARY OSTEOARTHRITIS OF BOTH KNEES: ICD-10-CM

## 2025-04-08 DIAGNOSIS — G89.29 BILATERAL CHRONIC KNEE PAIN: Primary | ICD-10-CM

## 2025-04-08 DIAGNOSIS — M25.562 BILATERAL CHRONIC KNEE PAIN: Primary | ICD-10-CM

## 2025-04-08 DIAGNOSIS — M76.61 TENDONITIS, ACHILLES, RIGHT: ICD-10-CM

## 2025-04-08 PROCEDURE — 20610 DRAIN/INJ JOINT/BURSA W/O US: CPT | Performed by: FAMILY MEDICINE

## 2025-04-08 PROCEDURE — 73562 X-RAY EXAM OF KNEE 3: CPT | Performed by: RADIOLOGY

## 2025-04-08 PROCEDURE — 73650 X-RAY EXAM OF HEEL: CPT

## 2025-04-08 PROCEDURE — 73562 X-RAY EXAM OF KNEE 3: CPT

## 2025-04-08 PROCEDURE — 73650 X-RAY EXAM OF HEEL: CPT | Performed by: RADIOLOGY

## 2025-04-08 PROCEDURE — 99214 OFFICE O/P EST MOD 30 MIN: CPT | Performed by: FAMILY MEDICINE

## 2025-04-08 RX ORDER — LIDOCAINE HYDROCHLORIDE 10 MG/ML
4 INJECTION, SOLUTION EPIDURAL; INFILTRATION; INTRACAUDAL; PERINEURAL
Status: COMPLETED | OUTPATIENT
Start: 2025-04-08 | End: 2025-04-08

## 2025-04-08 RX ORDER — LIDOCAINE HYDROCHLORIDE 10 MG/ML
5 INJECTION, SOLUTION EPIDURAL; INFILTRATION; INTRACAUDAL; PERINEURAL
Status: COMPLETED | OUTPATIENT
Start: 2025-04-08 | End: 2025-04-08

## 2025-04-08 RX ORDER — TIRZEPATIDE 5 MG/.5ML
INJECTION, SOLUTION SUBCUTANEOUS
COMMUNITY
Start: 2025-03-10

## 2025-04-08 RX ORDER — METHYLPREDNISOLONE ACETATE 80 MG/ML
80 INJECTION, SUSPENSION INTRA-ARTICULAR; INTRALESIONAL; INTRAMUSCULAR; SOFT TISSUE
Status: COMPLETED | OUTPATIENT
Start: 2025-04-08 | End: 2025-04-08

## 2025-04-08 RX ADMIN — METHYLPREDNISOLONE ACETATE 80 MG: 80 INJECTION, SUSPENSION INTRA-ARTICULAR; INTRALESIONAL; INTRAMUSCULAR; SOFT TISSUE at 14:03

## 2025-04-08 RX ADMIN — LIDOCAINE HYDROCHLORIDE 5 ML: 10 INJECTION, SOLUTION EPIDURAL; INFILTRATION; INTRACAUDAL; PERINEURAL at 14:03

## 2025-04-08 RX ADMIN — LIDOCAINE HYDROCHLORIDE 4 ML: 10 INJECTION, SOLUTION EPIDURAL; INFILTRATION; INTRACAUDAL; PERINEURAL at 14:03

## 2025-04-08 NOTE — PROGRESS NOTES
"Follow Up Visit      Patient: Deepali Pandey  YOB: 1960  Date of Encounter: 04/08/2025  PCP: Skylar Butterfield MD  Referring Provider: No ref. provider found     Subjective   Deepali Pandey is a 65 y.o. female who presents to the office today for evaluation of Follow-up, Edema, and Pain of the Left Knee (Patient states the left knee has more pain than the right knee at this time. ) and Follow-up, Edema, and Pain of the Right Knee      Chief Complaint   Patient presents with    Left Knee - Follow-up, Edema, Pain     Patient states the left knee has more pain than the right knee at this time.     Right Knee - Follow-up, Edema, Pain       History of Present Illness    Patient presents for follow-up's of chronic bilateral knee pain secondary to osteoarthritis.  Notes that both knees are starting to flareup over the left was much worse than the right.  Got good results from all injections in the past and wants to have them again.  Also complains of new onset of right Achilles tendon swelling and pain with activity with no injury  Patient Active Problem List   Diagnosis    Bronchiectasis with acute exacerbation    Lung nodule       Past Medical History:   Diagnosis Date    High cholesterol     Hypertension     Knee swelling     Thyroid disease        No Known Allergies    Vitals:   /75 (BP Location: Left arm, Patient Position: Sitting, Cuff Size: Adult)   Pulse 73   Resp 18   Ht 172.7 cm (67.99\")   Wt 85.7 kg (189 lb)   SpO2 96%   BMI 28.75 kg/m²           Visit Vitals  /75 (BP Location: Left arm, Patient Position: Sitting, Cuff Size: Adult)   Pulse 73   Resp 18   Ht 172.7 cm (67.99\")   Wt 85.7 kg (189 lb)   SpO2 96%   BMI 28.75 kg/m²     65 y.o.female        Physical Exam    Physical Exam  Vitals and nursing note reviewed.   Constitutional:       General: She is not in acute distress.     Appearance: Normal appearance.   Pulmonary:      Effort: Pulmonary effort is normal. No respiratory " distress.   Musculoskeletal:      Right knee: Bony tenderness present. No effusion or crepitus. Decreased range of motion. Tenderness present over the medial joint line. No LCL laxity or MCL laxity.      Left knee: Bony tenderness present. No effusion or crepitus. Decreased range of motion. Tenderness present over the medial joint line and patellar tendon. No LCL laxity or MCL laxity.     Right ankle: Normal range of motion.      Right Achilles Tendon: Tenderness present. No defects. Muñoz's test negative.      Comments: Right Achilles tendon mildly tender and swollen just above the Achilles tendon insertion on the calcaneus but no pain on resisted testing and no defect    Range of motion and pain levels of left knee is notably worse on the right.   Skin:     General: Skin is warm and dry.      Findings: No erythema.   Neurological:      General: No focal deficit present.      Mental Status: She is alert.      Sensory: No sensory deficit.      Motor: No weakness.         Radiology Results:    No radiology results for the last 30 days.  XR Knee Bilateral AP Standing  Narrative: EXAM:    XR Knee Bilateral AP Standing     EXAM DATE:    8/14/2023 12:45 PM     CLINICAL HISTORY:    ; M23.90-Unspecified internal derangement of unspecified knee     TECHNIQUE:    Anteroposterior x-ray of the bilateral knees with patient in a  standing position.     COMPARISON:    No relevant prior studies available.     FINDINGS:    Bones/joints:  Moderate-advanced osteoarthritis of the right greater  than left medial knee compartments.  Moderate osteoarthritis of the left  greater than right patellofemoral compartments.  Bilateral suprapatellar  joint effusions are noted, moderate in size.  No acute fracture.  No  dislocation.    Soft tissues:  Unremarkable.     Impression: 1.  Moderate-advanced osteoarthritis in the medial and patellofemoral  compartments as detailed above.  2.  Bilateral knee joint effusions.     This report was  finalized on 8/14/2023 1:39 PM by Dr. Chi Aguayo MD.         Results  Procedures   - Large Joint Arthrocentesis: R knee on 4/8/2025 2:03 PM  Indications: pain  Details: 20 G needle, anterolateral approach  Medications: 4 mL lidocaine PF 1% 1 %; 60 mg Sodium Hyaluronate 60 MG/3ML  Outcome: tolerated well, no immediate complications    Injection site on the lateral knee was cleaned with alcohol and iodine and anesthesia was provided with ethyl chloride spray.  Then 4 cc of 1% lidocaine without epinephrine was injected into the subcutaneous tissues and joint using a 1.5 inch 25-gauge needle for anesthesia.  Then using sterile technique and a 20-gauge 1.5 inch needle the knee joint was accessed and 60mg of Durolane was injected.  The injection site was covered with sterile bandage.  There is no adverse effects and negligible  blood loss.  Follow-up care and precautions were discussed.      Consent was given by the patient. Immediately prior to procedure a time out was called to verify the correct patient, procedure, equipment, support staff and site/side marked as required. Patient was prepped and draped in the usual sterile fashion.       - Large Joint Arthrocentesis: L knee on 4/8/2025 2:03 PM  Indications: pain, joint swelling and diagnostic evaluation  Details: 22 G needle, anterolateral approach  Medications: 5 mL lidocaine PF 1% 1 %; 80 mg methylPREDNISolone acetate 80 MG/ML  Outcome: tolerated well, no immediate complications    Injection site on lateral knee was cleaned with alcohol and iodine and anesthesia provided with ethyl chloride.  Then using 22-gauge 1.5 inch needle the knee joint was accessed and a mixture of 5 cc of 1% lidocaine and 80 mg of methylprednisolone were injected.  Area was covered with sterile bandage.  Follow-up care precautions were discussed.  There were no adverse effects and negligible blood loss.       Procedure, treatment alternatives, risks and benefits explained, specific risks  discussed. Consent was given by the patient. Immediately prior to procedure a time out was called to verify the correct patient, procedure, equipment, support staff and site/side marked as required. Patient was prepped and draped in the usual sterile fashion.          Assessment & Plan  Diagnoses and all orders for this visit:    1. Bilateral chronic knee pain (Primary)  -     XR Knee 3 View Bilateral  -     - Large Joint Arthrocentesis: R knee  -     - Large Joint Arthrocentesis: L knee  -     lidocaine PF 1% (XYLOCAINE) injection 4 mL  -     lidocaine PF 1% (XYLOCAINE) injection 5 mL  -     Sodium Hyaluronate injection 60 mg  -     methylPREDNISolone acetate (DEPO-medrol) injection 80 mg  -     Diclofenac Sodium (VOLTAREN) 1 % gel gel; Apply 4 g topically to the appropriate area as directed 4 (Four) Times a Day As Needed (knee pain, heel pain).  Dispense: 350 g; Refill: 5    2. Primary osteoarthritis of both knees  -     XR Knee 3 View Bilateral  -     - Large Joint Arthrocentesis: R knee  -     - Large Joint Arthrocentesis: L knee  -     lidocaine PF 1% (XYLOCAINE) injection 4 mL  -     lidocaine PF 1% (XYLOCAINE) injection 5 mL  -     Sodium Hyaluronate injection 60 mg  -     methylPREDNISolone acetate (DEPO-medrol) injection 80 mg  -     Diclofenac Sodium (VOLTAREN) 1 % gel gel; Apply 4 g topically to the appropriate area as directed 4 (Four) Times a Day As Needed (knee pain, heel pain).  Dispense: 350 g; Refill: 5    3. Pain of right heel  -     XR Calcaneus 2+ View Right    4. Tendonitis, Achilles, right  -     XR Calcaneus 2+ View Right             MEDS ORDERED DURING VISIT:  No orders of the defined types were placed in this encounter.    MEDICATION ISSUES:  Discussed medication options and treatment plan of prescribed medication as well as the risks, benefits, and side effects including potential falls, possible impaired driving and metabolic adversities among others. Patient is agreeable to call the  office with any worsening of symptoms or onset of side effects. Patient is agreeable to call 911 or go to the nearest ER should he/she begin having SI/HI.     Discussion:  After discussion of treatment options patient elected to get injections for her flareup of chronic osteoarthritis pain.  The left knee is flared up fairly badly and so patient elected to get a steroid injection there which has helped in the past.  The right knee is doing fairly well and only starting to be painful again and so she received a Durolane injection in that knee.  She will follow-up in 1 month for reevaluation.  Patient will be given home exercise program for the right Achilles tendinitis.  We will get an x-ray of the heel and new x-rays of the knees in the interim.  Patient can also use her Voltaren gel on the heel.      This document has been electronically signed by Tani Savage DO   April 8, 2025 14:01 EDT

## 2025-05-09 ENCOUNTER — OFFICE VISIT (OUTPATIENT)
Dept: ORTHOPEDIC SURGERY | Facility: CLINIC | Age: 65
End: 2025-05-09
Payer: MEDICARE

## 2025-05-09 VITALS
SYSTOLIC BLOOD PRESSURE: 123 MMHG | WEIGHT: 189 LBS | HEIGHT: 68 IN | DIASTOLIC BLOOD PRESSURE: 76 MMHG | OXYGEN SATURATION: 97 % | HEART RATE: 71 BPM | BODY MASS INDEX: 28.64 KG/M2

## 2025-05-09 DIAGNOSIS — M25.561 BILATERAL CHRONIC KNEE PAIN: Primary | ICD-10-CM

## 2025-05-09 DIAGNOSIS — M79.671 PAIN OF RIGHT HEEL: ICD-10-CM

## 2025-05-09 DIAGNOSIS — M76.61 TENDONITIS, ACHILLES, RIGHT: ICD-10-CM

## 2025-05-09 DIAGNOSIS — G89.29 BILATERAL CHRONIC KNEE PAIN: Primary | ICD-10-CM

## 2025-05-09 DIAGNOSIS — M17.0 PRIMARY OSTEOARTHRITIS OF BOTH KNEES: ICD-10-CM

## 2025-05-09 DIAGNOSIS — M25.562 BILATERAL CHRONIC KNEE PAIN: Primary | ICD-10-CM

## 2025-05-09 RX ORDER — LIDOCAINE HYDROCHLORIDE 10 MG/ML
4 INJECTION, SOLUTION EPIDURAL; INFILTRATION; INTRACAUDAL; PERINEURAL
Status: COMPLETED | OUTPATIENT
Start: 2025-05-09 | End: 2025-05-09

## 2025-05-09 RX ADMIN — LIDOCAINE HYDROCHLORIDE 4 ML: 10 INJECTION, SOLUTION EPIDURAL; INFILTRATION; INTRACAUDAL; PERINEURAL at 15:48

## 2025-05-09 NOTE — PROGRESS NOTES
"Follow Up Visit      Patient: Deepali Pandey  YOB: 1960  Date of Encounter: 05/09/2025  PCP: Skylar Butterfield MD  Referring Provider: No ref. provider found     Subjective   Deepali Pandey is a 65 y.o. female who presents to the office today for evaluation of Follow-up, Edema, and Pain of the Left Knee (Patient states the left knee has more pain than the right knee at this time. ) and Follow-up, Edema, and Pain of the Right Knee      Chief Complaint   Patient presents with    Left Knee - Follow-up, Edema, Pain     Patient states the left knee has more pain than the right knee at this time.     Right Knee - Follow-up, Edema, Pain       History of Present Illness  The patient presents for follow-up of bilateral knee pain and arthritis.    She had a viscosupplementation injection in the right knee at the last visit and a steroid injection in the left knee as it was flared up. She notes that both knees are feeling significantly better, although the left one is still bothering her slightly, and she is interested in further treatment for that knee. She had x-rays and needs to go over the results. She reports that her Achilles tendon pain only bothers her if she is wearing shoes where the heel pushes directly onto the tendon. She is not experiencing any current fevers or illness and is not on any antibiotics.    Patient Active Problem List   Diagnosis    Bronchiectasis with acute exacerbation    Lung nodule       Past Medical History:   Diagnosis Date    High cholesterol     Hypertension     Knee swelling     Thyroid disease        No Known Allergies    Vitals:   /76 (BP Location: Left arm, Patient Position: Sitting, Cuff Size: Adult)   Pulse 71   Ht 172.7 cm (67.99\")   Wt 85.7 kg (189 lb)   SpO2 97%   BMI 28.74 kg/m²     Visit Vitals  /76 (BP Location: Left arm, Patient Position: Sitting, Cuff Size: Adult)   Pulse 71   Ht 172.7 cm (67.99\")   Wt 85.7 kg (189 lb)   SpO2 97%   BMI 28.74 kg/m² "     65 y.o.female        Physical Exam    Physical Exam  Vitals and nursing note reviewed.   Constitutional:       General: She is not in acute distress.     Appearance: Normal appearance.   Pulmonary:      Effort: Pulmonary effort is normal. No respiratory distress.   Musculoskeletal:      Right knee: No effusion, bony tenderness or crepitus. Decreased range of motion. No tenderness. No LCL laxity or MCL laxity.      Left knee: Bony tenderness present. No effusion or crepitus. Decreased range of motion. Tenderness present over the medial joint line. No patellar tendon tenderness. No LCL laxity or MCL laxity.     Right ankle: Normal range of motion.      Right Achilles Tendon: No tenderness or defects. Muñoz's test negative.      Comments: Right Achilles tendon mildly enlarged but nontender   Skin:     General: Skin is warm and dry.      Findings: No erythema.   Neurological:      General: No focal deficit present.      Mental Status: She is alert.      Sensory: No sensory deficit.      Motor: No weakness.         Radiology Results:    No radiology results for the last 30 days.  XR Calcaneus 2+ View Right  Narrative: EXAM:    XR Right Calcaneus, 2 or More Views     EXAM DATE:    4/8/2025 2:26 PM     CLINICAL HISTORY:    heel pain and swelling at achilles insertion. no injury; M79.671-Pain  in right foot; M76.61-Achilles tendinitis, right leg     TECHNIQUE:    Lateral and plantar views of the right calcaneus.     COMPARISON:    No relevant prior studies available.     FINDINGS:    Bones/joints:  Unremarkable.  No acute fracture.  No dislocation.    Soft tissues:  Unremarkable.  No radiopaque foreign body.     Impression: No acute osseous or articular abnormality.     This report was finalized on 4/8/2025 4:35 PM by Dr. Chi Aguayo MD.     XR Knee 3 View Bilateral  Narrative: EXAM:    XR Bilateral Knees, 3 Views     EXAM DATE:    4/8/2025 2:25 PM     CLINICAL HISTORY:    chronic knee pain, arthritis;  M25.561-Pain in right knee; M25.562-Pain  in left knee; G89.29-Other chronic pain; M17.0-Bilateral primary  osteoarthritis of knee     TECHNIQUE:    Three views of the bilateral knees.     COMPARISON:    No relevant prior studies available.     FINDINGS:    Bones/joints:  Tricompartmental degenerative changes in the left knee  consistent with osteoarthritis.  Tricompartmental degenerative changes  in the right knee consistent with osteoarthritis. Findings are most  pronounced of the medial compartments. No acute fracture.  No  dislocation.    Soft tissues:  Unremarkable.     Impression: 1.  Tricompartmental degenerative changes in the left knee consistent  with osteoarthritis.  2.  Tricompartmental degenerative changes in the right knee consistent  with osteoarthritis.  3. No acute fracture or dislocation.     This report was finalized on 4/8/2025 4:35 PM by Dr. Chi Aguayo MD.         Results  Imaging  Knee x-rays show significant arthritic changes. Calcaneus x-ray is normal.  Diagnoses and all orders for this visit:    1. Bilateral chronic knee pain (Primary)    2. Primary osteoarthritis of both knees    3. Pain of right heel    4. Tendonitis, Achilles, right    Other orders  -     - Large Joint Arthrocentesis      - Large Joint Arthrocentesis: L knee on 5/9/2025 3:48 PM  Indications: pain  Details: 20 G needle, anterolateral approach  Medications: 4 mL lidocaine PF 1% 1 %; 60 mg Sodium Hyaluronate 60 MG/3ML  Outcome: tolerated well, no immediate complications    Injection site on the lateral knee was cleaned with alcohol and iodine and anesthesia was provided with ethyl chloride spray.  Then 4 cc of 1% lidocaine without epinephrine was injected into the subcutaneous tissues and joint using a 1.5 inch 25-gauge needle for anesthesia.  Then using sterile technique and a 20-gauge 1.5 inch needle the knee joint was accessed and 60mg of Durolane was injected.  The injection site was covered with sterile bandage.   There is no adverse effects and negligible  blood loss.  Follow-up care and precautions were discussed.      Consent was given by the patient. Immediately prior to procedure a time out was called to verify the correct patient, procedure, equipment, support staff and site/side marked as required. Patient was prepped and draped in the usual sterile fashion.          Assessment & Plan  1. Bilateral knee pain.  She reports significant improvement in both knees following previous injections, with the left knee still causing minor discomfort. Knee x-rays show significant arthritic changes. She received a Durolane injection in the left knee today without adverse effects. She will follow up as needed when the knees begin to bother her again and will call in 1 month to report on the effectiveness of the injection.    2. Achilles tendon pain.  She experiences Achilles tendon pain only when wearing shoes that press on the tendon. Calcaneus x-ray is normal.  Needs to continue doing home exercise program and use topical NSAIDs as needed    PROCEDURE  A viscosupplementation injection was administered in the right knee at the last visit. A steroid injection was given in the left knee during the previous visit. Today, a Durolane injection was administered in the left knee without adverse effect.       MEDS ORDERED DURING VISIT:  No orders of the defined types were placed in this encounter.    MEDICATION ISSUES:  Discussed medication options and treatment plan of prescribed medication as well as the risks, benefits, and side effects including potential falls, possible impaired driving and metabolic adversities among others. Patient is agreeable to call the office with any worsening of symptoms or onset of side effects. Patient is agreeable to call 911 or go to the nearest ER should he/she begin having SI/HI.     Discussion:        This document has been electronically signed by Tani Savage DO   May 9, 2025 15:48 EDT

## 2025-06-23 ENCOUNTER — TRANSCRIBE ORDERS (OUTPATIENT)
Dept: ADMINISTRATIVE | Facility: HOSPITAL | Age: 65
End: 2025-06-23
Payer: MEDICARE

## 2025-06-23 DIAGNOSIS — Z87.891 PERSONAL HISTORY OF TOBACCO USE, PRESENTING HAZARDS TO HEALTH: Primary | ICD-10-CM

## 2025-07-01 ENCOUNTER — HOSPITAL ENCOUNTER (OUTPATIENT)
Facility: HOSPITAL | Age: 65
Discharge: HOME | End: 2025-07-01
Admitting: INTERNAL MEDICINE
Payer: MEDICARE

## 2025-07-01 DIAGNOSIS — Z87.891 PERSONAL HISTORY OF TOBACCO USE, PRESENTING HAZARDS TO HEALTH: ICD-10-CM

## 2025-07-01 PROCEDURE — 71271 CT THORAX LUNG CANCER SCR C-: CPT | Performed by: RADIOLOGY

## 2025-07-01 PROCEDURE — 71271 CT THORAX LUNG CANCER SCR C-: CPT

## (undated) DEVICE — BRUSH CYTO MULTI APPL

## (undated) DEVICE — TUBING, SUCTION, 1/4" X 20', STRAIGHT: Brand: MEDLINE INDUSTRIES, INC.

## (undated) DEVICE — TUBING, SUCTION, 3/16" X 6', STRAIGHT: Brand: MEDLINE

## (undated) DEVICE — SINGLE USE BIOPSY VALVE MAJ-210: Brand: SINGLE USE BIOPSY VALVE (STERILE)

## (undated) DEVICE — Device

## (undated) DEVICE — SYR LUER SLPTP 50ML

## (undated) DEVICE — ADAPT SWVL FIBROPTIC BRONCH

## (undated) DEVICE — TRAP,MUCUS SPECIMEN,40CC: Brand: MEDLINE

## (undated) DEVICE — THE BITE BLOCK MAXI, LATEX FREE STRAP IS USED TO PROTECT THE ENDOSCOPE INSERTION TUBE FROM BEING BITTEN BY THE PATIENT.

## (undated) DEVICE — SINGLE USE SUCTION VALVE MAJ-209: Brand: SINGLE USE SUCTION VALVE (STERILE)

## (undated) DEVICE — GOWN,REINF,POLY,ECL,PP SLV,XL: Brand: MEDLINE

## (undated) DEVICE — DEV NDL ASP TRNSBRNCH EXCELON 19G 15MM 130CM